# Patient Record
Sex: FEMALE | Race: WHITE | Employment: UNEMPLOYED | URBAN - METROPOLITAN AREA
[De-identification: names, ages, dates, MRNs, and addresses within clinical notes are randomized per-mention and may not be internally consistent; named-entity substitution may affect disease eponyms.]

---

## 2017-01-25 ENCOUNTER — OFFICE VISIT (OUTPATIENT)
Dept: CARDIOLOGY CLINIC | Age: 82
End: 2017-01-25

## 2017-01-25 ENCOUNTER — HOSPITAL ENCOUNTER (OUTPATIENT)
Dept: OTHER | Age: 82
Discharge: HOME | End: 2017-01-25
Attending: INTERNAL MEDICINE | Admitting: INTERNAL MEDICINE

## 2017-01-25 VITALS
SYSTOLIC BLOOD PRESSURE: 110 MMHG | HEIGHT: 65 IN | DIASTOLIC BLOOD PRESSURE: 70 MMHG | BODY MASS INDEX: 19.99 KG/M2 | HEART RATE: 82 BPM | WEIGHT: 120 LBS

## 2017-01-25 DIAGNOSIS — I10 ESSENTIAL HYPERTENSION: ICD-10-CM

## 2017-01-25 DIAGNOSIS — I25.10 CORONARY ARTERY DISEASE INVOLVING NATIVE HEART WITHOUT ANGINA PECTORIS, UNSPECIFIED VESSEL OR LESION TYPE: ICD-10-CM

## 2017-01-25 DIAGNOSIS — I48.19 PERSISTENT ATRIAL FIBRILLATION (HCC): ICD-10-CM

## 2017-01-25 DIAGNOSIS — R42 DIZZINESS: ICD-10-CM

## 2017-01-25 DIAGNOSIS — E78.5 HYPERLIPIDEMIA, UNSPECIFIED HYPERLIPIDEMIA TYPE: ICD-10-CM

## 2017-01-25 DIAGNOSIS — I25.110 ATHEROSCLEROTIC HEART DISEASE OF NATIVE CORONARY ARTERY WITH UNSTABLE ANGINA PECTORIS (HCC): ICD-10-CM

## 2017-01-25 DIAGNOSIS — R42 DIZZINESS: Primary | ICD-10-CM

## 2017-01-25 DIAGNOSIS — I65.29 STENOSIS OF CAROTID ARTERY, UNSPECIFIED LATERALITY: ICD-10-CM

## 2017-01-25 LAB
ANION GAP SERPL CALCULATED.3IONS-SCNC: 17 MMOL/L (ref 3–16)
BASOPHILS ABSOLUTE: 0.1 K/UL (ref 0–0.2)
BASOPHILS RELATIVE PERCENT: 0.6 %
BUN BLDV-MCNC: 20 MG/DL (ref 7–20)
CALCIUM SERPL-MCNC: 9.7 MG/DL (ref 8.3–10.6)
CHLORIDE BLD-SCNC: 103 MMOL/L (ref 99–110)
CO2: 25 MMOL/L (ref 21–32)
CREAT SERPL-MCNC: 1.3 MG/DL (ref 0.6–1.2)
EOSINOPHILS ABSOLUTE: 0.1 K/UL (ref 0–0.6)
EOSINOPHILS RELATIVE PERCENT: 1.4 %
FOLATE: >20 NG/ML (ref 4.78–24.2)
GFR AFRICAN AMERICAN: 46
GFR NON-AFRICAN AMERICAN: 38
GLUCOSE BLD-MCNC: 84 MG/DL (ref 70–99)
HCT VFR BLD CALC: 48.6 % (ref 36–48)
HEMOGLOBIN: 16 G/DL (ref 12–16)
LYMPHOCYTES ABSOLUTE: 3 K/UL (ref 1–5.1)
LYMPHOCYTES RELATIVE PERCENT: 33.3 %
MCH RBC QN AUTO: 32.3 PG (ref 26–34)
MCHC RBC AUTO-ENTMCNC: 33 G/DL (ref 31–36)
MCV RBC AUTO: 97.9 FL (ref 80–100)
MONOCYTES ABSOLUTE: 0.9 K/UL (ref 0–1.3)
MONOCYTES RELATIVE PERCENT: 9.9 %
NEUTROPHILS ABSOLUTE: 5 K/UL (ref 1.7–7.7)
NEUTROPHILS RELATIVE PERCENT: 54.8 %
PDW BLD-RTO: 14 % (ref 12.4–15.4)
PLATELET # BLD: 150 K/UL (ref 135–450)
PMV BLD AUTO: 8.4 FL (ref 5–10.5)
POTASSIUM SERPL-SCNC: 4.9 MMOL/L (ref 3.5–5.1)
PRO-BNP: 2651 PG/ML (ref 0–449)
RBC # BLD: 4.96 M/UL (ref 4–5.2)
SEDIMENTATION RATE, ERYTHROCYTE: 13 MM/HR (ref 0–30)
SODIUM BLD-SCNC: 145 MMOL/L (ref 136–145)
TSH REFLEX: 3.02 UIU/ML (ref 0.27–4.2)
VITAMIN B-12: 639 PG/ML (ref 211–911)
WBC # BLD: 9.1 K/UL (ref 4–11)

## 2017-01-25 PROCEDURE — 99214 OFFICE O/P EST MOD 30 MIN: CPT | Performed by: INTERNAL MEDICINE

## 2017-01-25 RX ORDER — ASPIRIN 81 MG/1
TABLET ORAL
Qty: 30 TABLET | Refills: 0 | COMMUNITY
Start: 2017-01-25 | End: 2018-01-05

## 2017-01-30 RX ORDER — METOPROLOL SUCCINATE 50 MG/1
TABLET, EXTENDED RELEASE ORAL
Qty: 30 TABLET | Refills: 6 | Status: SHIPPED | OUTPATIENT
Start: 2017-01-30 | End: 2017-10-04 | Stop reason: SDUPTHER

## 2017-02-01 ENCOUNTER — NURSE ONLY (OUTPATIENT)
Dept: CARDIOLOGY CLINIC | Age: 82
End: 2017-02-01

## 2017-02-01 DIAGNOSIS — R42 DIZZINESS: Primary | ICD-10-CM

## 2017-02-06 ENCOUNTER — OFFICE VISIT (OUTPATIENT)
Dept: FAMILY MEDICINE CLINIC | Age: 82
End: 2017-02-06

## 2017-02-06 VITALS
HEART RATE: 66 BPM | WEIGHT: 119 LBS | BODY MASS INDEX: 19.8 KG/M2 | DIASTOLIC BLOOD PRESSURE: 62 MMHG | OXYGEN SATURATION: 99 % | SYSTOLIC BLOOD PRESSURE: 116 MMHG

## 2017-02-06 DIAGNOSIS — Z23 NEEDS FLU SHOT: Primary | ICD-10-CM

## 2017-02-06 DIAGNOSIS — Z87.898 H/O DIZZINESS: ICD-10-CM

## 2017-02-06 PROCEDURE — 99213 OFFICE O/P EST LOW 20 MIN: CPT | Performed by: FAMILY MEDICINE

## 2017-02-06 PROCEDURE — G0008 ADMIN INFLUENZA VIRUS VAC: HCPCS | Performed by: FAMILY MEDICINE

## 2017-02-06 PROCEDURE — 90688 IIV4 VACCINE SPLT 0.5 ML IM: CPT | Performed by: FAMILY MEDICINE

## 2017-02-07 PROCEDURE — 93224 XTRNL ECG REC UP TO 48 HRS: CPT | Performed by: INTERNAL MEDICINE

## 2017-02-08 ENCOUNTER — ANTI-COAG VISIT (OUTPATIENT)
Dept: PHARMACY | Age: 82
End: 2017-02-08

## 2017-02-08 DIAGNOSIS — I48.19 PERSISTENT ATRIAL FIBRILLATION (HCC): ICD-10-CM

## 2017-02-08 LAB
INR BLD: 2.4
PROTIME: 29.2 SECONDS

## 2017-03-08 ENCOUNTER — ANTI-COAG VISIT (OUTPATIENT)
Dept: PHARMACY | Age: 82
End: 2017-03-08

## 2017-03-08 DIAGNOSIS — I48.19 PERSISTENT ATRIAL FIBRILLATION (HCC): ICD-10-CM

## 2017-03-08 LAB
INR BLD: 2.5
PROTIME: 30.1 SECONDS

## 2017-04-19 ENCOUNTER — ANTI-COAG VISIT (OUTPATIENT)
Dept: PHARMACY | Age: 82
End: 2017-04-19

## 2017-04-19 DIAGNOSIS — I48.19 PERSISTENT ATRIAL FIBRILLATION (HCC): ICD-10-CM

## 2017-04-19 LAB
INR BLD: 2.3
PROTIME: 27.1 SECONDS

## 2017-05-15 ENCOUNTER — TELEPHONE (OUTPATIENT)
Dept: CARDIOLOGY CLINIC | Age: 82
End: 2017-05-15

## 2017-05-15 RX ORDER — SIMVASTATIN 20 MG
20 TABLET ORAL NIGHTLY
Qty: 90 TABLET | Refills: 3 | Status: SHIPPED | OUTPATIENT
Start: 2017-05-15 | End: 2018-01-05 | Stop reason: SDUPTHER

## 2017-05-31 ENCOUNTER — ANTI-COAG VISIT (OUTPATIENT)
Dept: PHARMACY | Age: 82
End: 2017-05-31

## 2017-05-31 DIAGNOSIS — I48.19 PERSISTENT ATRIAL FIBRILLATION (HCC): ICD-10-CM

## 2017-05-31 LAB
INR BLD: 2.1
PROTIME: 24.7 SECONDS

## 2017-07-18 ENCOUNTER — ANTI-COAG VISIT (OUTPATIENT)
Dept: PHARMACY | Age: 82
End: 2017-07-18

## 2017-07-18 DIAGNOSIS — I48.91 ATRIAL FIBRILLATION, UNSPECIFIED TYPE (HCC): ICD-10-CM

## 2017-07-18 LAB
INR BLD: 2.3
PROTIME: 27.5 SECONDS

## 2017-07-20 ENCOUNTER — OFFICE VISIT (OUTPATIENT)
Dept: ENT CLINIC | Age: 82
End: 2017-07-20

## 2017-07-20 VITALS
HEART RATE: 74 BPM | WEIGHT: 119 LBS | DIASTOLIC BLOOD PRESSURE: 62 MMHG | HEIGHT: 63 IN | BODY MASS INDEX: 21.09 KG/M2 | SYSTOLIC BLOOD PRESSURE: 128 MMHG | RESPIRATION RATE: 16 BRPM

## 2017-07-20 DIAGNOSIS — H90.5 HEARING DISORDER, SENSORINEURAL: Primary | ICD-10-CM

## 2017-07-20 DIAGNOSIS — H61.23 BILATERAL IMPACTED CERUMEN: ICD-10-CM

## 2017-07-20 PROCEDURE — 69210 REMOVE IMPACTED EAR WAX UNI: CPT | Performed by: OTOLARYNGOLOGY

## 2017-07-24 PROBLEM — S32.9XXA CLOSED FRACTURE OF PELVIS (HCC): Status: ACTIVE | Noted: 2017-07-24

## 2017-07-28 ENCOUNTER — TELEPHONE (OUTPATIENT)
Dept: PHARMACY | Age: 82
End: 2017-07-28

## 2017-08-15 ENCOUNTER — TELEPHONE (OUTPATIENT)
Dept: CARDIOLOGY CLINIC | Age: 82
End: 2017-08-15

## 2017-08-15 ENCOUNTER — TELEPHONE (OUTPATIENT)
Dept: PHARMACY | Age: 82
End: 2017-08-15

## 2017-08-16 ENCOUNTER — OFFICE VISIT (OUTPATIENT)
Dept: ORTHOPEDIC SURGERY | Age: 82
End: 2017-08-16

## 2017-08-16 VITALS — WEIGHT: 121.47 LBS | HEIGHT: 63 IN | BODY MASS INDEX: 21.52 KG/M2

## 2017-08-16 DIAGNOSIS — S32.9XXS: Primary | ICD-10-CM

## 2017-08-16 PROCEDURE — 99213 OFFICE O/P EST LOW 20 MIN: CPT | Performed by: ORTHOPAEDIC SURGERY

## 2017-08-16 PROCEDURE — 72170 X-RAY EXAM OF PELVIS: CPT | Performed by: ORTHOPAEDIC SURGERY

## 2017-08-16 RX ORDER — ACETAMINOPHEN 500 MG
1000 TABLET ORAL EVERY 6 HOURS PRN
Qty: 60 TABLET | Refills: 3 | Status: SHIPPED | OUTPATIENT
Start: 2017-08-16

## 2017-08-17 ENCOUNTER — TELEPHONE (OUTPATIENT)
Dept: FAMILY MEDICINE CLINIC | Age: 82
End: 2017-08-17

## 2017-08-18 ENCOUNTER — ANTI-COAG VISIT (OUTPATIENT)
Dept: CARDIOLOGY CLINIC | Age: 82
End: 2017-08-18

## 2017-08-18 ENCOUNTER — TELEPHONE (OUTPATIENT)
Dept: CARDIOLOGY CLINIC | Age: 82
End: 2017-08-18

## 2017-08-18 ENCOUNTER — TELEPHONE (OUTPATIENT)
Dept: PHARMACY | Age: 82
End: 2017-08-18

## 2017-08-18 ENCOUNTER — TELEPHONE (OUTPATIENT)
Dept: FAMILY MEDICINE CLINIC | Age: 82
End: 2017-08-18

## 2017-08-18 LAB — INR BLD: 2.8

## 2017-08-24 ENCOUNTER — TELEPHONE (OUTPATIENT)
Dept: CARDIOLOGY CLINIC | Age: 82
End: 2017-08-24

## 2017-08-24 ENCOUNTER — ANTI-COAG VISIT (OUTPATIENT)
Dept: CARDIOLOGY CLINIC | Age: 82
End: 2017-08-24

## 2017-08-24 LAB — INR BLD: 3.9

## 2017-08-29 ENCOUNTER — TELEPHONE (OUTPATIENT)
Dept: PHARMACY | Age: 82
End: 2017-08-29

## 2017-08-29 ENCOUNTER — TELEPHONE (OUTPATIENT)
Dept: CARDIOLOGY CLINIC | Age: 82
End: 2017-08-29

## 2017-08-29 DIAGNOSIS — R42 DIZZINESS: Primary | ICD-10-CM

## 2017-08-31 ENCOUNTER — ANTI-COAG VISIT (OUTPATIENT)
Dept: CARDIOLOGY CLINIC | Age: 82
End: 2017-08-31

## 2017-08-31 ENCOUNTER — TELEPHONE (OUTPATIENT)
Dept: CARDIOLOGY CLINIC | Age: 82
End: 2017-08-31

## 2017-08-31 LAB — INR BLD: 3.4

## 2017-09-07 ENCOUNTER — TELEPHONE (OUTPATIENT)
Dept: CARDIOLOGY CLINIC | Age: 82
End: 2017-09-07

## 2017-09-07 ENCOUNTER — ANTI-COAG VISIT (OUTPATIENT)
Dept: CARDIOLOGY CLINIC | Age: 82
End: 2017-09-07

## 2017-09-07 LAB — INR BLD: 2.4

## 2017-09-14 ENCOUNTER — TELEPHONE (OUTPATIENT)
Dept: CARDIOLOGY CLINIC | Age: 82
End: 2017-09-14

## 2017-09-14 ENCOUNTER — ANTI-COAG VISIT (OUTPATIENT)
Dept: CARDIOLOGY CLINIC | Age: 82
End: 2017-09-14

## 2017-09-14 LAB — INR BLD: 3.9

## 2017-10-04 ENCOUNTER — TELEPHONE (OUTPATIENT)
Dept: CARDIOLOGY CLINIC | Age: 82
End: 2017-10-04

## 2017-10-04 ENCOUNTER — ANTI-COAG VISIT (OUTPATIENT)
Dept: PHARMACY | Age: 82
End: 2017-10-04

## 2017-10-04 DIAGNOSIS — I48.91 ATRIAL FIBRILLATION, UNSPECIFIED TYPE (HCC): ICD-10-CM

## 2017-10-04 LAB
INR BLD: 2.6
PROTIME: 31.8 SECONDS

## 2017-10-04 RX ORDER — METOPROLOL SUCCINATE 50 MG/1
TABLET, EXTENDED RELEASE ORAL
Qty: 30 TABLET | Refills: 11 | Status: SHIPPED | OUTPATIENT
Start: 2017-10-04 | End: 2018-01-05 | Stop reason: SDUPTHER

## 2017-10-04 NOTE — MR AVS SNAPSHOT
After Visit Summary             Elana Rg   10/4/2017 11:15 AM   Anti-coag visit    Description:  Female : 1921   Provider:  RILEY Perry KRISTIN [facility]   Department:  [address]/[facility]              Your Follow-Up and Future Appointments         Below is a list of your follow-up and future appointments. This may not be a complete list as you may have made appointments directly with providers that we are not aware of or your providers may have made some for you. Please call your providers to confirm appointments. It is important to keep your appointments. Please bring your current insurance card, photo ID, co-pay, and all medication bottles to your appointment. If self-pay, payment is expected at the time of service. Your To-Do List     Future Appointments Provider Department Dept Phone    2017 11:00 AM [facility] [phone]         Information from Your Visit        Department     Name Address Phone Fax    [address]/[facility] [address] [place] [phone]      You Were Seen for:         Comments    Atrial fibrillation, unspecified type [facility])   [[id number]]         Anticoagulation Summary as of 10/4/2017              Today's INR 2.6    Next INR check 2017      Description           Continue same weekly dose of 2.5mg daily      Vital Signs     Last Menstrual Period Smoking Status                1975 Former Smoker             Medications and Orders      Your Current Medications Are              acetaminophen (APAP EXTRA STRENGTH) 500 MG tablet Take 2 tablets by mouth every 6 hours as needed for Pain    docusate sodium (COLACE, DULCOLAX) 100 MG CAPS Take 100 mg by mouth 2 times daily    lidocaine (LIDODERM) 5 % Place 1 patch onto the skin daily 12 hours on, 12 hours off.

## 2017-10-04 NOTE — TELEPHONE ENCOUNTER
E-prescribed the Metoprolol ER 50 mg, one tab po daily, into [facility], [phone].

## 2017-10-04 NOTE — TELEPHONE ENCOUNTER
Medication Refill    When was your last appointment with cardiology?  (if 1year or longer, please schedule an appointment)    Medication needing refilled:metoprolol ER     Doseage of the medication:50mg     How are you taking this medication (QD, BID, TID, QID, PRN):    Patient want a 30 or 90 day supply called in:30    Which Pharmacy are we sending the medication to:[facility]     Pharmacy Phone number:totally out of med , needs today    Pharmacy Fax number:

## 2017-11-01 ENCOUNTER — ANTI-COAG VISIT (OUTPATIENT)
Dept: PHARMACY | Age: 82
End: 2017-11-01

## 2017-11-01 ENCOUNTER — HOSPITAL ENCOUNTER (OUTPATIENT)
Dept: OTHER | Age: 82
Discharge: HOME | End: 2017-11-30
Attending: INTERNAL MEDICINE | Admitting: INTERNAL MEDICINE

## 2017-11-01 DIAGNOSIS — I48.91 ATRIAL FIBRILLATION, UNSPECIFIED TYPE (HCC): ICD-10-CM

## 2017-11-01 LAB
INR BLD: 2.7
PROTIME: 32.4 SECONDS

## 2017-11-01 NOTE — PROGRESS NOTES
Mrs. Dodie Saavedra is a 80 y.o. y/o female with history of Afib diagnosed and started warfarin about June 2015. Pertinent PMH: HTN, CAD, Mild MI with stent placement, TIA, possible IBS undiagnosed  She presents today for anticoagulation monitoring and adjustment    Patient Reported Findings:  Yes     No  [x]   []       Patient verifies current dosing regimen as listed  []   [x]       S/S bleeding/bruising/swelling/SOB  []   [x]       Blood in urine or stool  []   [x]       Procedures scheduled in the future at this time  []   [x]       Missed Dose  []   [x]       Extra Dose  []   [x]       Change in medications  []   [x]       Change in health/diet/appetite  []   [x]       Change in alcohol use  []   [x]       Change in activity  []   [x]       Hospital admission  []   [x]       Emergency department visit  []   [x]       Other complaints    Clinical Outcomes:  Yes     No  []   [x]       Major bleeding event  []   [x]       Thromboembolic event    Duration of warfarin Therapy: indefinitely  INR Range:  2.0-3.0  She presents today accompanied, as usual, by her son Elia Denny. INR 2.7 today  Continue same weekly dose of 2.5mg daily  Encouraged to maintain a consistency of vegetables/salads.   Recheck INR in 4 weeks    Referring cardiologist is Dr. Noreen Calderon  INR (no units)   Date Value   11/01/2017 2.7   10/04/2017 2.6   09/14/2017 3.90   09/07/2017 2.40

## 2017-11-29 ENCOUNTER — ANTI-COAG VISIT (OUTPATIENT)
Dept: PHARMACY | Age: 82
End: 2017-11-29

## 2017-11-29 DIAGNOSIS — I48.91 ATRIAL FIBRILLATION, UNSPECIFIED TYPE (HCC): ICD-10-CM

## 2017-11-29 LAB
INR BLD: 2.4
PROTIME: 29.4 SECONDS

## 2017-11-29 NOTE — PROGRESS NOTES
Mrs. Dionisio Walker is a 80 y.o. y/o female with history of Afib diagnosed and started warfarin about June 2015. Pertinent PMH: HTN, CAD, Mild MI with stent placement, TIA, possible IBS undiagnosed  She presents today for anticoagulation monitoring and adjustment    Patient Reported Findings:  Yes     No  [x]   []       Patient verifies current dosing regimen as listed  []   [x]       S/S bleeding/bruising/swelling/SOB  []   [x]       Blood in urine or stool  []   [x]       Procedures scheduled in the future at this time  []   [x]       Missed Dose  []   [x]       Extra Dose  []   [x]       Change in medications  []   [x]       Change in health/diet/appetite  []   [x]       Change in alcohol use  []   [x]       Change in activity  []   [x]       Hospital admission  []   [x]       Emergency department visit  []   [x]       Other complaints    Clinical Outcomes:  Yes     No  []   [x]       Major bleeding event  []   [x]       Thromboembolic event    Duration of warfarin Therapy: indefinitely  INR Range:  2.0-3.0  She presents today accompanied, as usual, by her son Yelitza Russell. INR 2.7 today  Continue same weekly dose of 2.5mg daily  Encouraged to maintain a consistency of vegetables/salads.   Recheck INR in 6 weeks    Referring cardiologist is Dr. Gunner Brown  INR (no units)   Date Value   11/29/2017 2.4   11/01/2017 2.7   10/04/2017 2.6   09/14/2017 3.90

## 2017-11-30 NOTE — TELEPHONE ENCOUNTER
Warfarin prescription phoned into [facility] [address] to [address] under Dr. Michael Mccrary  Warfarin 2.5 mg tabs  Take 2.5mg daily   90 days   2 refills

## 2017-12-01 ENCOUNTER — HOSPITAL ENCOUNTER (OUTPATIENT)
Dept: OTHER | Age: 82
Discharge: HOME | End: 2017-12-31
Attending: INTERNAL MEDICINE | Admitting: INTERNAL MEDICINE

## 2018-01-01 ENCOUNTER — ANTI-COAG VISIT (OUTPATIENT)
Dept: PHARMACY | Age: 83
End: 2018-01-01
Payer: MEDICARE

## 2018-01-01 ENCOUNTER — APPOINTMENT (OUTPATIENT)
Dept: PHARMACY | Age: 83
End: 2018-01-01
Payer: MEDICARE

## 2018-01-01 ENCOUNTER — HOSPITAL ENCOUNTER (OUTPATIENT)
Dept: OTHER | Age: 83
Discharge: HOME | End: 2018-01-31
Attending: INTERNAL MEDICINE | Admitting: INTERNAL MEDICINE

## 2018-01-01 DIAGNOSIS — I48.21 PERMANENT ATRIAL FIBRILLATION (HCC): ICD-10-CM

## 2018-01-01 LAB
INTERNATIONAL NORMALIZATION RATIO, POC: 2.6
INTERNATIONAL NORMALIZATION RATIO, POC: 2.8

## 2018-01-01 PROCEDURE — 99211 OFF/OP EST MAY X REQ PHY/QHP: CPT

## 2018-01-01 PROCEDURE — 85610 PROTHROMBIN TIME: CPT

## 2018-01-05 ENCOUNTER — OFFICE VISIT (OUTPATIENT)
Dept: CARDIOLOGY CLINIC | Age: 83
End: 2018-01-05

## 2018-01-05 VITALS
HEIGHT: 62 IN | SYSTOLIC BLOOD PRESSURE: 130 MMHG | BODY MASS INDEX: 22.08 KG/M2 | HEART RATE: 92 BPM | WEIGHT: 120 LBS | DIASTOLIC BLOOD PRESSURE: 76 MMHG

## 2018-01-05 DIAGNOSIS — I10 ESSENTIAL HYPERTENSION: ICD-10-CM

## 2018-01-05 DIAGNOSIS — I48.21 PERMANENT ATRIAL FIBRILLATION (HCC): ICD-10-CM

## 2018-01-05 DIAGNOSIS — I25.10 CORONARY ARTERY DISEASE INVOLVING NATIVE CORONARY ARTERY OF NATIVE HEART WITHOUT ANGINA PECTORIS: Primary | ICD-10-CM

## 2018-01-05 DIAGNOSIS — R42 DIZZINESS: ICD-10-CM

## 2018-01-05 DIAGNOSIS — E78.00 HYPERCHOLESTEREMIA: ICD-10-CM

## 2018-01-05 PROCEDURE — 99214 OFFICE O/P EST MOD 30 MIN: CPT | Performed by: INTERNAL MEDICINE

## 2018-01-05 RX ORDER — METOPROLOL SUCCINATE 50 MG/1
TABLET, EXTENDED RELEASE ORAL
Qty: 90 TABLET | Refills: 3 | Status: SHIPPED | OUTPATIENT
Start: 2018-01-05 | End: 2019-01-01 | Stop reason: SDUPTHER

## 2018-01-05 RX ORDER — SIMVASTATIN 20 MG
20 TABLET ORAL NIGHTLY
Qty: 90 TABLET | Refills: 3 | Status: SHIPPED | OUTPATIENT
Start: 2018-01-05 | End: 2019-01-01 | Stop reason: SDUPTHER

## 2018-01-10 ENCOUNTER — ANTI-COAG VISIT (OUTPATIENT)
Dept: PHARMACY | Age: 83
End: 2018-01-10

## 2018-01-10 DIAGNOSIS — I48.21 PERMANENT ATRIAL FIBRILLATION (HCC): ICD-10-CM

## 2018-01-10 LAB
INR BLD: 2.5
PROTIME: 29.5 SECONDS

## 2018-01-10 NOTE — PROGRESS NOTES
Mrs. Sue Roberts is a 80 y.o. y/o female with history of Afib diagnosed and started warfarin about June 2015. Pertinent PMH: HTN, CAD, Mild MI with stent placement, TIA, possible IBS undiagnosed  She presents today for anticoagulation monitoring and adjustment    Patient Reported Findings:  Yes     No  [x]   []       Patient verifies current dosing regimen as listed  []   [x]       S/S bleeding/bruising/swelling/SOB  []   [x]       Blood in urine or stool  []   [x]       Procedures scheduled in the future at this time  []   [x]       Missed Dose  []   [x]       Extra Dose  []   [x]       Change in medications  []   [x]       Change in health/diet/appetite  []   [x]       Change in alcohol use  []   [x]       Change in activity  []   [x]       Hospital admission  []   [x]       Emergency department visit  []   [x]       Other complaints    Clinical Outcomes:  Yes     No  []   [x]       Major bleeding event  []   [x]       Thromboembolic event    Duration of warfarin Therapy: indefinitely  INR Range:  2.0-3.0  She presents today accompanied, as usual, by her son Jon Loving. INR 2.5 today  Continue same weekly dose of 2.5mg daily  Encouraged to maintain a consistency of vegetables/salads.   Recheck INR in 6 weeks    Referring cardiologist is Dr. Nimesh Ruiz  INR (no units)   Date Value   01/10/2018 2.5   11/29/2017 2.4   11/01/2017 2.7   10/04/2017 2.6

## 2018-02-01 ENCOUNTER — HOSPITAL ENCOUNTER (OUTPATIENT)
Dept: OTHER | Age: 83
Discharge: HOME | End: 2018-02-28
Attending: INTERNAL MEDICINE | Admitting: INTERNAL MEDICINE

## 2018-02-14 ENCOUNTER — OFFICE VISIT (OUTPATIENT)
Dept: ORTHOPEDIC SURGERY | Age: 83
End: 2018-02-14

## 2018-02-14 DIAGNOSIS — M76.31 ILIOTIBIAL BAND SYNDROME OF RIGHT SIDE: ICD-10-CM

## 2018-02-14 DIAGNOSIS — S32.9XXS CLOSED NONDISPLACED FRACTURE OF PELVIS, UNSPECIFIED PART OF PELVIS, SEQUELA: Primary | ICD-10-CM

## 2018-02-14 PROCEDURE — 99213 OFFICE O/P EST LOW 20 MIN: CPT | Performed by: ORTHOPAEDIC SURGERY

## 2018-02-14 NOTE — PROGRESS NOTES
are no rashes, ulcerations or lesions. Gait: She does use a walker to assist with ambulation    Additional Comments:       Additional Examinations:         Left Lower Extremity: Examination of the left lower extremity does not show any tenderness, deformity or injury. Range of motion is unremarkable. There is no gross instability. There are no rashes, ulcerations or lesions. Strength and tone are normal.    Radiology:     X-rays obtained and reviewed in office:  Views 6 views of the pelvis right hip and thigh does demonstrate surgical hardware intact and in good position. There is no evidence of stress reaction about the right femoral stem or distal to the femoral stem. 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  Closed nondisplaced fracture of pelvis, unspecified part of pelvis, sequela Yes    Iliotibial band syndrome of right side        Office Procedures:  Orders Placed This Encounter   Procedures    XR HIP RIGHT (2-3 VIEWS)     [address]     Order Specific Question:   Reason for exam:     Answer:   Pain       Treatment Plan:  I discussed diagnosis and prognosis with her today. I do not see any stress reaction within her femur and considering this is been going on for roughly 3-4 years I'm comfortable that we are not dealing with a stress fracture within the femur. She is tender to palpation over the ITB and I'm going to give her some IT band stretching exercises. She also does use Tylenol once a day she is going to increase usage to roughly 2 or 3 times a day. I'll see her back if this is not improving in the future.

## 2018-02-21 ENCOUNTER — ANTI-COAG VISIT (OUTPATIENT)
Dept: PHARMACY | Age: 83
End: 2018-02-21

## 2018-02-21 DIAGNOSIS — I48.21 PERMANENT ATRIAL FIBRILLATION (HCC): ICD-10-CM

## 2018-02-21 LAB
INR BLD: 2.6
PROTIME: 31.1 SECONDS

## 2018-03-01 ENCOUNTER — HOSPITAL ENCOUNTER (OUTPATIENT)
Dept: OTHER | Age: 83
Discharge: HOME | End: 2018-03-31
Attending: INTERNAL MEDICINE | Admitting: INTERNAL MEDICINE

## 2018-04-01 ENCOUNTER — HOSPITAL ENCOUNTER (OUTPATIENT)
Dept: OTHER | Age: 83
Discharge: HOME | End: 2018-04-30
Attending: INTERNAL MEDICINE | Admitting: INTERNAL MEDICINE

## 2018-04-04 ENCOUNTER — ANTI-COAG VISIT (OUTPATIENT)
Dept: PHARMACY | Age: 83
End: 2018-04-04

## 2018-04-04 DIAGNOSIS — I48.21 PERMANENT ATRIAL FIBRILLATION (HCC): ICD-10-CM

## 2018-04-04 LAB
INR BLD: 1.4
PROTIME: 17.3 SECONDS

## 2018-04-18 ENCOUNTER — ANTI-COAG VISIT (OUTPATIENT)
Dept: PHARMACY | Age: 83
End: 2018-04-18

## 2018-04-18 DIAGNOSIS — I48.21 PERMANENT ATRIAL FIBRILLATION (HCC): ICD-10-CM

## 2018-04-18 LAB
INR BLD: 2
PROTIME: 23.6 SECONDS

## 2018-05-01 ENCOUNTER — HOSPITAL ENCOUNTER (OUTPATIENT)
Dept: OTHER | Age: 83
Discharge: HOME | End: 2018-05-31
Attending: INTERNAL MEDICINE | Admitting: INTERNAL MEDICINE

## 2018-05-30 ENCOUNTER — ANTI-COAG VISIT (OUTPATIENT)
Dept: PHARMACY | Age: 83
End: 2018-05-30

## 2018-05-30 DIAGNOSIS — I48.21 PERMANENT ATRIAL FIBRILLATION (HCC): ICD-10-CM

## 2018-05-30 LAB
INR BLD: 2.4
PROTIME: 28.8 SECONDS

## 2018-06-01 ENCOUNTER — HOSPITAL ENCOUNTER (OUTPATIENT)
Dept: OTHER | Age: 83
Discharge: HOME | End: 2018-06-30
Attending: INTERNAL MEDICINE | Admitting: INTERNAL MEDICINE

## 2018-07-01 ENCOUNTER — HOSPITAL ENCOUNTER (OUTPATIENT)
Dept: OTHER | Age: 83
Discharge: HOME | End: 2018-07-31
Attending: INTERNAL MEDICINE | Admitting: INTERNAL MEDICINE

## 2018-07-18 ENCOUNTER — ANTI-COAG VISIT (OUTPATIENT)
Dept: PHARMACY | Age: 83
End: 2018-07-18

## 2018-07-18 DIAGNOSIS — I48.21 PERMANENT ATRIAL FIBRILLATION (HCC): ICD-10-CM

## 2018-07-18 LAB
INR BLD: 2.2
PROTIME: 27.8 SECONDS

## 2018-07-18 RX ORDER — WARFARIN SODIUM 2.5 MG/1
2.5 TABLET ORAL SEE ADMIN INSTRUCTIONS
Status: ON HOLD | COMMUNITY
End: 2019-01-01 | Stop reason: HOSPADM

## 2018-08-01 ENCOUNTER — HOSPITAL ENCOUNTER (OUTPATIENT)
Dept: OTHER | Age: 83
Discharge: HOME | End: 2018-08-31
Attending: INTERNAL MEDICINE | Admitting: INTERNAL MEDICINE

## 2018-08-29 ENCOUNTER — ANTI-COAG VISIT (OUTPATIENT)
Dept: PHARMACY | Age: 83
End: 2018-08-29

## 2018-08-29 DIAGNOSIS — I48.21 PERMANENT ATRIAL FIBRILLATION (HCC): ICD-10-CM

## 2018-08-29 LAB
INR BLD: 2.5
PROTIME: 29.5 SECONDS

## 2018-09-01 ENCOUNTER — HOSPITAL ENCOUNTER (OUTPATIENT)
Dept: OTHER | Age: 83
Discharge: HOME | End: 2018-09-01
Attending: INTERNAL MEDICINE | Admitting: INTERNAL MEDICINE

## 2018-11-28 NOTE — PROGRESS NOTES
Mrs. Chi Dee is a 80 y.o. y/o female with history of Afib diagnosed and started warfarin about June 2015. Pertinent PMH: HTN, CAD, Mild MI with stent placement, TIA, possible IBS undiagnosed  She presents today for anticoagulation monitoring and adjustment    Patient Reported Findings:  Yes     No  [x]   []       Patient verifies current dosing regimen as listed  []   [x]       S/S bleeding/bruising/swelling/SOB  []   [x]       Blood in urine or stool  []   [x]       Procedures scheduled in the future at this time  []   [x]       Missed Dose   []   [x]       Extra Dose  []   [x]       Change in medications continues with Tylenol at bedtime and occasionally extra during the day. [x]   []       Change in health/diet/appetite Appetite has been gradually reducing and now she has been drinking Boost.  []   [x]       Change in alcohol use  []   [x]       Change in activity  []   [x]       Hospital admission  []   [x]       Emergency department visit  [x]   []       Other complaints She c/o of constant dizziness or blurred vision to the degree that she is not using her walker anymore and presents in a wheelchair. Clinical Outcomes:  Yes     No  []   [x]       Major bleeding event  []   [x]       Thromboembolic event    Duration of warfarin Therapy: indefinitely  INR Range:  2.0-3.0  She presents today accompanied, as usual, by her son Yusuf Renee. INR 2.8 today  Continue same weekly dose of 2.5mg daily. Since INR has been gradually rising over the year suggested trying to increase the vegetable diet as best that she can in an effort to keep the INR therapeutic. Encouraged to maintain a consistency of vegetables/salads.   Recheck INR in 5 weeks on 1/2/19  Normally 6 week appointments    Referring cardiologist is Dr. Humble Tobar  INR (no units)   Date Value   11/28/2018 2.8   10/17/2018 2.6   08/29/2018 2.5   07/18/2018 2.2   05/30/2018 2.4   04/18/2018 2

## 2019-01-01 ENCOUNTER — ANTI-COAG VISIT (OUTPATIENT)
Dept: PHARMACY | Age: 84
End: 2019-01-01
Payer: MEDICARE

## 2019-01-01 ENCOUNTER — APPOINTMENT (OUTPATIENT)
Dept: CT IMAGING | Age: 84
End: 2019-01-01
Payer: MEDICARE

## 2019-01-01 ENCOUNTER — APPOINTMENT (OUTPATIENT)
Dept: CT IMAGING | Age: 84
DRG: 062 | End: 2019-01-01
Payer: MEDICARE

## 2019-01-01 ENCOUNTER — TELEPHONE (OUTPATIENT)
Dept: FAMILY MEDICINE CLINIC | Age: 84
End: 2019-01-01

## 2019-01-01 ENCOUNTER — APPOINTMENT (OUTPATIENT)
Dept: GENERAL RADIOLOGY | Age: 84
DRG: 062 | End: 2019-01-01
Payer: MEDICARE

## 2019-01-01 ENCOUNTER — HOSPITAL ENCOUNTER (EMERGENCY)
Age: 84
Discharge: LEFT BEFORE DISCHARGE | End: 2019-08-11
Attending: EMERGENCY MEDICINE
Payer: MEDICARE

## 2019-01-01 ENCOUNTER — CARE COORDINATION (OUTPATIENT)
Dept: CARE COORDINATION | Age: 84
End: 2019-01-01

## 2019-01-01 ENCOUNTER — HOSPITAL ENCOUNTER (INPATIENT)
Age: 84
LOS: 5 days | Discharge: NURSING, REHAB OR LONG-TERM CARE | DRG: 062 | End: 2019-08-26
Attending: EMERGENCY MEDICINE | Admitting: EMERGENCY MEDICINE
Payer: MEDICARE

## 2019-01-01 ENCOUNTER — OFFICE VISIT (OUTPATIENT)
Dept: CARDIOLOGY CLINIC | Age: 84
End: 2019-01-01
Payer: MEDICARE

## 2019-01-01 ENCOUNTER — OFFICE VISIT (OUTPATIENT)
Dept: FAMILY MEDICINE CLINIC | Age: 84
End: 2019-01-01
Payer: MEDICARE

## 2019-01-01 ENCOUNTER — TELEPHONE (OUTPATIENT)
Dept: CARDIOLOGY CLINIC | Age: 84
End: 2019-01-01

## 2019-01-01 ENCOUNTER — ANTI-COAG VISIT (OUTPATIENT)
Dept: PHARMACY | Age: 84
End: 2019-01-01

## 2019-01-01 ENCOUNTER — TELEPHONE (OUTPATIENT)
Dept: PHARMACY | Age: 84
End: 2019-01-01

## 2019-01-01 ENCOUNTER — APPOINTMENT (OUTPATIENT)
Dept: GENERAL RADIOLOGY | Age: 84
End: 2019-01-01
Payer: MEDICARE

## 2019-01-01 VITALS
TEMPERATURE: 98.4 F | WEIGHT: 105.7 LBS | HEIGHT: 62 IN | OXYGEN SATURATION: 93 % | DIASTOLIC BLOOD PRESSURE: 66 MMHG | RESPIRATION RATE: 16 BRPM | BODY MASS INDEX: 19.45 KG/M2 | HEART RATE: 111 BPM | SYSTOLIC BLOOD PRESSURE: 124 MMHG

## 2019-01-01 VITALS
HEART RATE: 80 BPM | HEIGHT: 62 IN | OXYGEN SATURATION: 98 % | SYSTOLIC BLOOD PRESSURE: 130 MMHG | DIASTOLIC BLOOD PRESSURE: 66 MMHG | BODY MASS INDEX: 19.32 KG/M2 | WEIGHT: 105 LBS

## 2019-01-01 VITALS
WEIGHT: 105 LBS | DIASTOLIC BLOOD PRESSURE: 70 MMHG | BODY MASS INDEX: 19.2 KG/M2 | OXYGEN SATURATION: 98 % | SYSTOLIC BLOOD PRESSURE: 116 MMHG | HEART RATE: 67 BPM

## 2019-01-01 VITALS
DIASTOLIC BLOOD PRESSURE: 72 MMHG | TEMPERATURE: 98.1 F | HEART RATE: 80 BPM | RESPIRATION RATE: 21 BRPM | SYSTOLIC BLOOD PRESSURE: 162 MMHG | OXYGEN SATURATION: 97 %

## 2019-01-01 DIAGNOSIS — G45.9 TIA (TRANSIENT ISCHEMIC ATTACK): Primary | ICD-10-CM

## 2019-01-01 DIAGNOSIS — I63.9 ACUTE CVA (CEREBROVASCULAR ACCIDENT) (HCC): ICD-10-CM

## 2019-01-01 DIAGNOSIS — I48.21 PERMANENT ATRIAL FIBRILLATION (HCC): ICD-10-CM
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
## 2019-01-01 DIAGNOSIS — R42 DIZZINESS: ICD-10-CM

## 2019-01-01 DIAGNOSIS — R91.1 LUNG NODULE: ICD-10-CM

## 2019-01-01 DIAGNOSIS — I63.512 CEREBROVASCULAR ACCIDENT (CVA) DUE TO OCCLUSION OF LEFT MIDDLE CEREBRAL ARTERY (HCC): Primary | ICD-10-CM

## 2019-01-01 DIAGNOSIS — I48.19 PERSISTENT ATRIAL FIBRILLATION (HCC): ICD-10-CM

## 2019-01-01 DIAGNOSIS — E78.00 HYPERCHOLESTEREMIA: ICD-10-CM

## 2019-01-01 DIAGNOSIS — I65.21 STENOSIS OF RIGHT CAROTID ARTERY: ICD-10-CM

## 2019-01-01 DIAGNOSIS — I25.10 CORONARY ARTERY DISEASE INVOLVING NATIVE HEART WITHOUT ANGINA PECTORIS, UNSPECIFIED VESSEL OR LESION TYPE: ICD-10-CM

## 2019-01-01 DIAGNOSIS — I10 ESSENTIAL HYPERTENSION: ICD-10-CM

## 2019-01-01 DIAGNOSIS — I48.91 ATRIAL FIBRILLATION, UNSPECIFIED TYPE (HCC): ICD-10-CM

## 2019-01-01 DIAGNOSIS — I48.21 PERMANENT ATRIAL FIBRILLATION (HCC): Primary | ICD-10-CM

## 2019-01-01 DIAGNOSIS — E78.00 HYPERCHOLESTEREMIA: Primary | ICD-10-CM

## 2019-01-01 LAB
A/G RATIO: 1 (ref 1.1–2.2)
ABO/RH: NORMAL
ALBUMIN SERPL-MCNC: 3.5 G/DL (ref 3.4–5)
ALBUMIN SERPL-MCNC: 3.6 G/DL (ref 3.4–5)
ALBUMIN SERPL-MCNC: 4 G/DL (ref 3.4–5)
ALP BLD-CCNC: 58 U/L (ref 40–129)
ALP BLD-CCNC: 58 U/L (ref 40–129)
ALT SERPL-CCNC: 12 U/L (ref 10–40)
ALT SERPL-CCNC: 13 U/L (ref 10–40)
ANION GAP SERPL CALCULATED.3IONS-SCNC: 10 MMOL/L (ref 3–16)
ANION GAP SERPL CALCULATED.3IONS-SCNC: 12 MMOL/L (ref 3–16)
ANION GAP SERPL CALCULATED.3IONS-SCNC: 13 MMOL/L (ref 3–16)
ANTIBODY SCREEN: NORMAL
APTT: 27 SEC (ref 26–36)
AST SERPL-CCNC: 19 U/L (ref 15–37)
AST SERPL-CCNC: 22 U/L (ref 15–37)
BASOPHILS ABSOLUTE: 0 K/UL (ref 0–0.2)
BASOPHILS ABSOLUTE: 0.1 K/UL (ref 0–0.2)
BASOPHILS RELATIVE PERCENT: 0.7 %
BASOPHILS RELATIVE PERCENT: 0.9 %
BILIRUB SERPL-MCNC: 0.5 MG/DL (ref 0–1)
BILIRUB SERPL-MCNC: 0.8 MG/DL (ref 0–1)
BILIRUBIN DIRECT: <0.2 MG/DL (ref 0–0.3)
BILIRUBIN URINE: NEGATIVE
BILIRUBIN, INDIRECT: NORMAL MG/DL (ref 0–1)
BLOOD, URINE: ABNORMAL
BUN BLDV-MCNC: 15 MG/DL (ref 7–20)
BUN BLDV-MCNC: 17 MG/DL (ref 7–20)
BUN BLDV-MCNC: 20 MG/DL (ref 7–20)
CALCIUM SERPL-MCNC: 8.9 MG/DL (ref 8.3–10.6)
CALCIUM SERPL-MCNC: 9 MG/DL (ref 8.3–10.6)
CALCIUM SERPL-MCNC: 9.2 MG/DL (ref 8.3–10.6)
CHLORIDE BLD-SCNC: 101 MMOL/L (ref 99–110)
CHLORIDE BLD-SCNC: 105 MMOL/L (ref 99–110)
CHLORIDE BLD-SCNC: 107 MMOL/L (ref 99–110)
CHOLESTEROL, TOTAL: 155 MG/DL (ref 0–199)
CLARITY: CLEAR
CO2: 19 MMOL/L (ref 21–32)
CO2: 23 MMOL/L (ref 21–32)
CO2: 24 MMOL/L (ref 21–32)
COLOR: YELLOW
CREAT SERPL-MCNC: 0.9 MG/DL (ref 0.6–1.2)
CREAT SERPL-MCNC: 1 MG/DL (ref 0.6–1.2)
CREAT SERPL-MCNC: 1.1 MG/DL (ref 0.6–1.2)
EKG ATRIAL RATE: 111 BPM
EKG ATRIAL RATE: 49 BPM
EKG DIAGNOSIS: NORMAL
EKG DIAGNOSIS: NORMAL
EKG Q-T INTERVAL: 364 MS
EKG Q-T INTERVAL: 366 MS
EKG QRS DURATION: 78 MS
EKG QRS DURATION: 84 MS
EKG QTC CALCULATION (BAZETT): 427 MS
EKG QTC CALCULATION (BAZETT): 457 MS
EKG R AXIS: -58 DEGREES
EKG R AXIS: -61 DEGREES
EKG T AXIS: -44 DEGREES
EKG T AXIS: -49 DEGREES
EKG VENTRICULAR RATE: 83 BPM
EKG VENTRICULAR RATE: 94 BPM
EOSINOPHILS ABSOLUTE: 0 K/UL (ref 0–0.6)
EOSINOPHILS ABSOLUTE: 0.1 K/UL (ref 0–0.6)
EOSINOPHILS RELATIVE PERCENT: 0.2 %
EOSINOPHILS RELATIVE PERCENT: 1.4 %
EPITHELIAL CELLS, UA: 2 /HPF (ref 0–5)
ESTIMATED AVERAGE GLUCOSE: 119.8 MG/DL
GFR AFRICAN AMERICAN: 56
GFR AFRICAN AMERICAN: >60
GFR AFRICAN AMERICAN: >60
GFR NON-AFRICAN AMERICAN: 46
GFR NON-AFRICAN AMERICAN: 51
GFR NON-AFRICAN AMERICAN: 58
GLOBULIN: 3.6 G/DL
GLUCOSE BLD-MCNC: 103 MG/DL (ref 70–99)
GLUCOSE BLD-MCNC: 117 MG/DL (ref 70–99)
GLUCOSE BLD-MCNC: 89 MG/DL (ref 70–99)
GLUCOSE BLD-MCNC: 89 MG/DL (ref 70–99)
GLUCOSE URINE: NEGATIVE MG/DL
HBA1C MFR BLD: 5.8 %
HCT VFR BLD CALC: 46.8 % (ref 36–48)
HCT VFR BLD CALC: 47.1 % (ref 36–48)
HCT VFR BLD CALC: 49.2 % (ref 36–48)
HDLC SERPL-MCNC: 43 MG/DL (ref 40–60)
HEMOGLOBIN: 15.3 G/DL (ref 12–16)
HEMOGLOBIN: 15.5 G/DL (ref 12–16)
HEMOGLOBIN: 16.2 G/DL (ref 12–16)
HYALINE CASTS: 1 /LPF (ref 0–8)
INR BLD: 1.23 (ref 0.86–1.14)
INR BLD: 1.29 (ref 0.86–1.14)
INR BLD: 1.32 (ref 0.86–1.14)
INR BLD: 1.35 (ref 0.86–1.14)
INR BLD: 1.68 (ref 0.86–1.14)
INTERNATIONAL NORMALIZATION RATIO, POC: 1.8
INTERNATIONAL NORMALIZATION RATIO, POC: 2
INTERNATIONAL NORMALIZATION RATIO, POC: 2.1
INTERNATIONAL NORMALIZATION RATIO, POC: 2.1
INTERNATIONAL NORMALIZATION RATIO, POC: 2.2
INTERNATIONAL NORMALIZATION RATIO, POC: 2.3
INTERNATIONAL NORMALIZATION RATIO, POC: 2.7
KETONES, URINE: NEGATIVE MG/DL
LDL CHOLESTEROL CALCULATED: 84 MG/DL
LEUKOCYTE ESTERASE, URINE: ABNORMAL
LYMPHOCYTES ABSOLUTE: 1.3 K/UL (ref 1–5.1)
LYMPHOCYTES ABSOLUTE: 3 K/UL (ref 1–5.1)
LYMPHOCYTES RELATIVE PERCENT: 24 %
LYMPHOCYTES RELATIVE PERCENT: 31.6 %
MAGNESIUM: 2 MG/DL (ref 1.8–2.4)
MAGNESIUM: 2.1 MG/DL (ref 1.8–2.4)
MCH RBC QN AUTO: 32.9 PG (ref 26–34)
MCH RBC QN AUTO: 32.9 PG (ref 26–34)
MCH RBC QN AUTO: 33 PG (ref 26–34)
MCHC RBC AUTO-ENTMCNC: 32.7 G/DL (ref 31–36)
MCHC RBC AUTO-ENTMCNC: 32.9 G/DL (ref 31–36)
MCHC RBC AUTO-ENTMCNC: 33 G/DL (ref 31–36)
MCV RBC AUTO: 100.1 FL (ref 80–100)
MCV RBC AUTO: 100.7 FL (ref 80–100)
MCV RBC AUTO: 99.9 FL (ref 80–100)
MICROSCOPIC EXAMINATION: YES
MONOCYTES ABSOLUTE: 0.7 K/UL (ref 0–1.3)
MONOCYTES ABSOLUTE: 1.2 K/UL (ref 0–1.3)
MONOCYTES RELATIVE PERCENT: 11.9 %
MONOCYTES RELATIVE PERCENT: 12.6 %
NEUTROPHILS ABSOLUTE: 3.5 K/UL (ref 1.7–7.7)
NEUTROPHILS ABSOLUTE: 5.1 K/UL (ref 1.7–7.7)
NEUTROPHILS RELATIVE PERCENT: 53.5 %
NEUTROPHILS RELATIVE PERCENT: 63.2 %
NITRITE, URINE: NEGATIVE
PDW BLD-RTO: 13.3 % (ref 12.4–15.4)
PDW BLD-RTO: 13.8 % (ref 12.4–15.4)
PDW BLD-RTO: 13.9 % (ref 12.4–15.4)
PERFORMED ON: NORMAL
PH UA: 7.5 (ref 5–8)
PHOSPHORUS: 3.1 MG/DL (ref 2.5–4.9)
PLATELET # BLD: 119 K/UL (ref 135–450)
PLATELET # BLD: 174 K/UL (ref 135–450)
PLATELET # BLD: 186 K/UL (ref 135–450)
PMV BLD AUTO: 8 FL (ref 5–10.5)
PMV BLD AUTO: 8.1 FL (ref 5–10.5)
PMV BLD AUTO: 8.1 FL (ref 5–10.5)
POTASSIUM SERPL-SCNC: 4.3 MMOL/L (ref 3.5–5.1)
POTASSIUM SERPL-SCNC: 4.6 MMOL/L (ref 3.5–5.1)
POTASSIUM SERPL-SCNC: 4.6 MMOL/L (ref 3.5–5.1)
PRO-BNP: 2381 PG/ML (ref 0–449)
PROTEIN UA: 100 MG/DL
PROTHROMBIN TIME: 14 SEC (ref 9.8–13)
PROTHROMBIN TIME: 14.7 SEC (ref 9.8–13)
PROTHROMBIN TIME: 15.1 SEC (ref 9.8–13)
PROTHROMBIN TIME: 15.4 SEC (ref 9.8–13)
PROTHROMBIN TIME: 19.1 SEC (ref 9.8–13)
RBC # BLD: 4.65 M/UL (ref 4–5.2)
RBC # BLD: 4.71 M/UL (ref 4–5.2)
RBC # BLD: 4.92 M/UL (ref 4–5.2)
RBC UA: 48 /HPF (ref 0–4)
SODIUM BLD-SCNC: 138 MMOL/L (ref 136–145)
SPECIFIC GRAVITY UA: >1.03 (ref 1–1.03)
TOTAL CK: 56 U/L (ref 26–192)
TOTAL PROTEIN: 7.2 G/DL (ref 6.4–8.2)
TOTAL PROTEIN: 7.2 G/DL (ref 6.4–8.2)
TRIGL SERPL-MCNC: 141 MG/DL (ref 0–150)
TROPONIN: <0.01 NG/ML
TROPONIN: <0.01 NG/ML
URINE CULTURE, ROUTINE: NORMAL
URINE REFLEX TO CULTURE: YES
URINE TYPE: ABNORMAL
UROBILINOGEN, URINE: 1 E.U./DL
VLDLC SERPL CALC-MCNC: 28 MG/DL
WBC # BLD: 5.5 K/UL (ref 4–11)
WBC # BLD: 9.5 K/UL (ref 4–11)
WBC # BLD: 9.6 K/UL (ref 4–11)
WBC UA: 14 /HPF (ref 0–5)

## 2019-01-01 PROCEDURE — 85025 COMPLETE CBC W/AUTO DIFF WBC: CPT

## 2019-01-01 PROCEDURE — 85610 PROTHROMBIN TIME: CPT

## 2019-01-01 PROCEDURE — 6360000004 HC RX CONTRAST MEDICATION: Performed by: PHYSICIAN ASSISTANT

## 2019-01-01 PROCEDURE — 6370000000 HC RX 637 (ALT 250 FOR IP): Performed by: INTERNAL MEDICINE

## 2019-01-01 PROCEDURE — 82550 ASSAY OF CK (CPK): CPT

## 2019-01-01 PROCEDURE — 94760 N-INVAS EAR/PLS OXIMETRY 1: CPT

## 2019-01-01 PROCEDURE — 36415 COLL VENOUS BLD VENIPUNCTURE: CPT

## 2019-01-01 PROCEDURE — 99211 OFF/OP EST MAY X REQ PHY/QHP: CPT

## 2019-01-01 PROCEDURE — 92610 EVALUATE SWALLOWING FUNCTION: CPT

## 2019-01-01 PROCEDURE — 2580000003 HC RX 258: Performed by: INTERNAL MEDICINE

## 2019-01-01 PROCEDURE — 99285 EMERGENCY DEPT VISIT HI MDM: CPT

## 2019-01-01 PROCEDURE — 6360000002 HC RX W HCPCS: Performed by: INTERNAL MEDICINE

## 2019-01-01 PROCEDURE — 71045 X-RAY EXAM CHEST 1 VIEW: CPT

## 2019-01-01 PROCEDURE — 99223 1ST HOSP IP/OBS HIGH 75: CPT | Performed by: INTERNAL MEDICINE

## 2019-01-01 PROCEDURE — 93010 ELECTROCARDIOGRAM REPORT: CPT | Performed by: INTERNAL MEDICINE

## 2019-01-01 PROCEDURE — 99214 OFFICE O/P EST MOD 30 MIN: CPT | Performed by: FAMILY MEDICINE

## 2019-01-01 PROCEDURE — 96360 HYDRATION IV INFUSION INIT: CPT

## 2019-01-01 PROCEDURE — 96365 THER/PROPH/DIAG IV INF INIT: CPT

## 2019-01-01 PROCEDURE — 2060000000 HC ICU INTERMEDIATE R&B

## 2019-01-01 PROCEDURE — 2580000003 HC RX 258: Performed by: EMERGENCY MEDICINE

## 2019-01-01 PROCEDURE — 72125 CT NECK SPINE W/O DYE: CPT

## 2019-01-01 PROCEDURE — 97530 THERAPEUTIC ACTIVITIES: CPT

## 2019-01-01 PROCEDURE — 92526 ORAL FUNCTION THERAPY: CPT

## 2019-01-01 PROCEDURE — 83735 ASSAY OF MAGNESIUM: CPT

## 2019-01-01 PROCEDURE — 3E03317 INTRODUCTION OF OTHER THROMBOLYTIC INTO PERIPHERAL VEIN, PERCUTANEOUS APPROACH: ICD-10-PCS | Performed by: INTERNAL MEDICINE

## 2019-01-01 PROCEDURE — 99233 SBSQ HOSP IP/OBS HIGH 50: CPT | Performed by: PSYCHIATRY & NEUROLOGY

## 2019-01-01 PROCEDURE — 97110 THERAPEUTIC EXERCISES: CPT

## 2019-01-01 PROCEDURE — 70496 CT ANGIOGRAPHY HEAD: CPT

## 2019-01-01 PROCEDURE — 97166 OT EVAL MOD COMPLEX 45 MIN: CPT

## 2019-01-01 PROCEDURE — 83880 ASSAY OF NATRIURETIC PEPTIDE: CPT

## 2019-01-01 PROCEDURE — 83036 HEMOGLOBIN GLYCOSYLATED A1C: CPT

## 2019-01-01 PROCEDURE — 94669 MECHANICAL CHEST WALL OSCILL: CPT

## 2019-01-01 PROCEDURE — 99232 SBSQ HOSP IP/OBS MODERATE 35: CPT | Performed by: PSYCHIATRY & NEUROLOGY

## 2019-01-01 PROCEDURE — 94667 MNPJ CHEST WALL 1ST: CPT

## 2019-01-01 PROCEDURE — 84484 ASSAY OF TROPONIN QUANT: CPT

## 2019-01-01 PROCEDURE — 99284 EMERGENCY DEPT VISIT MOD MDM: CPT

## 2019-01-01 PROCEDURE — 87086 URINE CULTURE/COLONY COUNT: CPT

## 2019-01-01 PROCEDURE — 93005 ELECTROCARDIOGRAM TRACING: CPT | Performed by: EMERGENCY MEDICINE

## 2019-01-01 PROCEDURE — 92507 TX SP LANG VOICE COMM INDIV: CPT

## 2019-01-01 PROCEDURE — 81001 URINALYSIS AUTO W/SCOPE: CPT

## 2019-01-01 PROCEDURE — 86850 RBC ANTIBODY SCREEN: CPT

## 2019-01-01 PROCEDURE — 96361 HYDRATE IV INFUSION ADD-ON: CPT

## 2019-01-01 PROCEDURE — 96376 TX/PRO/DX INJ SAME DRUG ADON: CPT

## 2019-01-01 PROCEDURE — 80069 RENAL FUNCTION PANEL: CPT

## 2019-01-01 PROCEDURE — 70450 CT HEAD/BRAIN W/O DYE: CPT

## 2019-01-01 PROCEDURE — 93005 ELECTROCARDIOGRAM TRACING: CPT | Performed by: PHYSICIAN ASSISTANT

## 2019-01-01 PROCEDURE — 86900 BLOOD TYPING SEROLOGIC ABO: CPT

## 2019-01-01 PROCEDURE — G0444 DEPRESSION SCREEN ANNUAL: HCPCS | Performed by: FAMILY MEDICINE

## 2019-01-01 PROCEDURE — 99214 OFFICE O/P EST MOD 30 MIN: CPT | Performed by: NURSE PRACTITIONER

## 2019-01-01 PROCEDURE — 2000000000 HC ICU R&B

## 2019-01-01 PROCEDURE — 99223 1ST HOSP IP/OBS HIGH 75: CPT | Performed by: PSYCHIATRY & NEUROLOGY

## 2019-01-01 PROCEDURE — 70486 CT MAXILLOFACIAL W/O DYE: CPT

## 2019-01-01 PROCEDURE — 73502 X-RAY EXAM HIP UNI 2-3 VIEWS: CPT

## 2019-01-01 PROCEDURE — 80053 COMPREHEN METABOLIC PANEL: CPT

## 2019-01-01 PROCEDURE — 6360000002 HC RX W HCPCS: Performed by: EMERGENCY MEDICINE

## 2019-01-01 PROCEDURE — 92523 SPEECH SOUND LANG COMPREHEN: CPT

## 2019-01-01 PROCEDURE — 80076 HEPATIC FUNCTION PANEL: CPT

## 2019-01-01 PROCEDURE — 80048 BASIC METABOLIC PNL TOTAL CA: CPT

## 2019-01-01 PROCEDURE — 70498 CT ANGIOGRAPHY NECK: CPT

## 2019-01-01 PROCEDURE — 2580000003 HC RX 258

## 2019-01-01 PROCEDURE — 85027 COMPLETE CBC AUTOMATED: CPT

## 2019-01-01 PROCEDURE — 85730 THROMBOPLASTIN TIME PARTIAL: CPT

## 2019-01-01 PROCEDURE — 86901 BLOOD TYPING SEROLOGIC RH(D): CPT

## 2019-01-01 PROCEDURE — 6360000002 HC RX W HCPCS

## 2019-01-01 PROCEDURE — 2500000003 HC RX 250 WO HCPCS: Performed by: INTERNAL MEDICINE

## 2019-01-01 PROCEDURE — 80061 LIPID PANEL: CPT

## 2019-01-01 PROCEDURE — 97162 PT EVAL MOD COMPLEX 30 MIN: CPT

## 2019-01-01 PROCEDURE — 97535 SELF CARE MNGMENT TRAINING: CPT

## 2019-01-01 RX ORDER — 0.9 % SODIUM CHLORIDE 0.9 %
1000 INTRAVENOUS SOLUTION INTRAVENOUS ONCE
Status: COMPLETED | OUTPATIENT
Start: 2019-01-01 | End: 2019-01-01

## 2019-01-01 RX ORDER — ATORVASTATIN CALCIUM 80 MG/1
80 TABLET, FILM COATED ORAL NIGHTLY
Status: DISCONTINUED | OUTPATIENT
Start: 2019-01-01 | End: 2019-01-01 | Stop reason: HOSPADM

## 2019-01-01 RX ORDER — 0.9 % SODIUM CHLORIDE 0.9 %
50 INTRAVENOUS SOLUTION INTRAVENOUS ONCE
Status: COMPLETED | OUTPATIENT
Start: 2019-01-01 | End: 2019-01-01

## 2019-01-01 RX ORDER — SODIUM CHLORIDE 0.9 % (FLUSH) 0.9 %
10 SYRINGE (ML) INJECTION EVERY 12 HOURS SCHEDULED
Status: DISCONTINUED | OUTPATIENT
Start: 2019-01-01 | End: 2019-01-01 | Stop reason: HOSPADM

## 2019-01-01 RX ORDER — SIMVASTATIN 20 MG
20 TABLET ORAL NIGHTLY
Qty: 90 TABLET | Refills: 3 | Status: SHIPPED | OUTPATIENT
Start: 2019-01-01 | End: 2019-01-01 | Stop reason: SINTOL

## 2019-01-01 RX ORDER — ASPIRIN 81 MG/1
81 TABLET ORAL DAILY
Status: DISCONTINUED | OUTPATIENT
Start: 2019-01-01 | End: 2019-01-01 | Stop reason: HOSPADM

## 2019-01-01 RX ORDER — DEXTROSE MONOHYDRATE 25 G/50ML
12.5 INJECTION, SOLUTION INTRAVENOUS
Status: ACTIVE | OUTPATIENT
Start: 2019-01-01 | End: 2019-01-01

## 2019-01-01 RX ORDER — AMLODIPINE BESYLATE 5 MG/1
5 TABLET ORAL DAILY
Status: DISCONTINUED | OUTPATIENT
Start: 2019-01-01 | End: 2019-01-01 | Stop reason: HOSPADM

## 2019-01-01 RX ORDER — WARFARIN SODIUM 2.5 MG/1
2.5 TABLET ORAL DAILY
Status: DISCONTINUED | OUTPATIENT
Start: 2019-01-01 | End: 2019-01-01

## 2019-01-01 RX ORDER — METOPROLOL SUCCINATE 50 MG/1
TABLET, EXTENDED RELEASE ORAL
Qty: 90 TABLET | Refills: 3 | Status: SHIPPED | OUTPATIENT
Start: 2019-01-01

## 2019-01-01 RX ORDER — SODIUM CHLORIDE 0.9 % (FLUSH) 0.9 %
10 SYRINGE (ML) INJECTION EVERY 12 HOURS SCHEDULED
Status: DISCONTINUED | OUTPATIENT
Start: 2019-01-01 | End: 2019-01-01 | Stop reason: SDUPTHER

## 2019-01-01 RX ORDER — SODIUM CHLORIDE 0.9 % (FLUSH) 0.9 %
10 SYRINGE (ML) INJECTION PRN
Status: DISCONTINUED | OUTPATIENT
Start: 2019-01-01 | End: 2019-01-01 | Stop reason: SDUPTHER

## 2019-01-01 RX ORDER — ASPIRIN 81 MG/1
81 TABLET ORAL DAILY
Qty: 90 TABLET | Refills: 1 | Status: SHIPPED | OUTPATIENT
Start: 2019-01-01

## 2019-01-01 RX ORDER — ONDANSETRON 2 MG/ML
4 INJECTION INTRAMUSCULAR; INTRAVENOUS EVERY 6 HOURS PRN
Status: DISCONTINUED | OUTPATIENT
Start: 2019-01-01 | End: 2019-01-01 | Stop reason: HOSPADM

## 2019-01-01 RX ORDER — ASPIRIN 300 MG/1
300 SUPPOSITORY RECTAL DAILY
Status: DISCONTINUED | OUTPATIENT
Start: 2019-01-01 | End: 2019-01-01 | Stop reason: HOSPADM

## 2019-01-01 RX ORDER — METOPROLOL SUCCINATE 50 MG/1
TABLET, EXTENDED RELEASE ORAL
Qty: 90 TABLET | Refills: 0 | Status: SHIPPED | OUTPATIENT
Start: 2019-01-01 | End: 2019-01-01 | Stop reason: SDUPTHER

## 2019-01-01 RX ORDER — SODIUM CHLORIDE 9 MG/ML
INJECTION, SOLUTION INTRAVENOUS
Status: COMPLETED
Start: 2019-01-01 | End: 2019-01-01

## 2019-01-01 RX ORDER — SODIUM CHLORIDE 9 MG/ML
INJECTION, SOLUTION INTRAVENOUS CONTINUOUS
Status: ACTIVE | OUTPATIENT
Start: 2019-01-01 | End: 2019-01-01

## 2019-01-01 RX ORDER — SODIUM CHLORIDE 0.9 % (FLUSH) 0.9 %
10 SYRINGE (ML) INJECTION PRN
Status: DISCONTINUED | OUTPATIENT
Start: 2019-01-01 | End: 2019-01-01 | Stop reason: HOSPADM

## 2019-01-01 RX ADMIN — ASPIRIN 81 MG: 81 TABLET, COATED ORAL at 09:11

## 2019-01-01 RX ADMIN — Medication 10 ML: at 09:10

## 2019-01-01 RX ADMIN — Medication 10 ML: at 09:27

## 2019-01-01 RX ADMIN — SODIUM CHLORIDE: 9 INJECTION, SOLUTION INTRAVENOUS at 14:07

## 2019-01-01 RX ADMIN — ASPIRIN 81 MG: 81 TABLET, COATED ORAL at 09:27

## 2019-01-01 RX ADMIN — ATORVASTATIN CALCIUM 80 MG: 80 TABLET, FILM COATED ORAL at 21:58

## 2019-01-01 RX ADMIN — MICONAZOLE NITRATE: 20 POWDER TOPICAL at 21:16

## 2019-01-01 RX ADMIN — SODIUM CHLORIDE 50 ML: 9 INJECTION, SOLUTION INTRAVENOUS at 16:51

## 2019-01-01 RX ADMIN — AMLODIPINE BESYLATE 5 MG: 5 TABLET ORAL at 12:27

## 2019-01-01 RX ADMIN — MICONAZOLE NITRATE: 20 POWDER TOPICAL at 23:26

## 2019-01-01 RX ADMIN — ALTEPLASE 38.6 MG: KIT at 16:06

## 2019-01-01 RX ADMIN — ASPIRIN 81 MG: 81 TABLET, COATED ORAL at 20:17

## 2019-01-01 RX ADMIN — SODIUM CHLORIDE 1000 ML: 9 INJECTION, SOLUTION INTRAVENOUS at 13:18

## 2019-01-01 RX ADMIN — SODIUM CHLORIDE: 9 INJECTION, SOLUTION INTRAVENOUS at 12:44

## 2019-01-01 RX ADMIN — AMLODIPINE BESYLATE 5 MG: 5 TABLET ORAL at 09:11

## 2019-01-01 RX ADMIN — MICONAZOLE NITRATE: 20 POWDER TOPICAL at 12:28

## 2019-01-01 RX ADMIN — AMLODIPINE BESYLATE 5 MG: 5 TABLET ORAL at 08:51

## 2019-01-01 RX ADMIN — MICONAZOLE NITRATE: 20 POWDER TOPICAL at 09:27

## 2019-01-01 RX ADMIN — ATORVASTATIN CALCIUM 80 MG: 80 TABLET, FILM COATED ORAL at 23:26

## 2019-01-01 RX ADMIN — Medication 10 ML: at 12:28

## 2019-01-01 RX ADMIN — ATORVASTATIN CALCIUM 80 MG: 80 TABLET, FILM COATED ORAL at 21:15

## 2019-01-01 RX ADMIN — MICONAZOLE NITRATE: 20 POWDER TOPICAL at 08:10

## 2019-01-01 RX ADMIN — Medication 10 ML: at 21:29

## 2019-01-01 RX ADMIN — MICONAZOLE NITRATE: 20 POWDER TOPICAL at 22:20

## 2019-01-01 RX ADMIN — IOPAMIDOL 75 ML: 755 INJECTION, SOLUTION INTRAVENOUS at 15:16

## 2019-01-01 RX ADMIN — ENOXAPARIN SODIUM 40 MG: 40 INJECTION SUBCUTANEOUS at 09:10

## 2019-01-01 RX ADMIN — SODIUM CHLORIDE: 9 INJECTION, SOLUTION INTRAVENOUS at 21:11

## 2019-01-01 RX ADMIN — ENOXAPARIN SODIUM 40 MG: 40 INJECTION SUBCUTANEOUS at 20:17

## 2019-01-01 RX ADMIN — Medication 10 ML: at 08:57

## 2019-01-01 RX ADMIN — ENOXAPARIN SODIUM 40 MG: 40 INJECTION SUBCUTANEOUS at 09:27

## 2019-01-01 RX ADMIN — MICONAZOLE NITRATE: 20 POWDER TOPICAL at 11:46

## 2019-01-01 RX ADMIN — MICONAZOLE NITRATE: 20 POWDER TOPICAL at 02:21

## 2019-01-01 RX ADMIN — ATORVASTATIN CALCIUM 80 MG: 80 TABLET, FILM COATED ORAL at 21:29

## 2019-01-01 RX ADMIN — Medication 10 ML: at 21:12

## 2019-01-01 RX ADMIN — ALTEPLASE 4.3 MG: KIT at 16:03

## 2019-01-01 RX ADMIN — MICONAZOLE NITRATE: 20 POWDER TOPICAL at 09:00

## 2019-01-01 RX ADMIN — Medication 10 ML: at 21:15

## 2019-01-01 RX ADMIN — IOPAMIDOL 75 ML: 755 INJECTION, SOLUTION INTRAVENOUS at 12:17

## 2019-01-01 RX ADMIN — MICONAZOLE NITRATE: 20 POWDER TOPICAL at 21:59

## 2019-01-01 RX ADMIN — Medication 10 ML: at 07:29

## 2019-01-01 RX ADMIN — ENOXAPARIN SODIUM 40 MG: 40 INJECTION SUBCUTANEOUS at 12:27

## 2019-01-01 RX ADMIN — Medication 10 ML: at 23:26

## 2019-01-01 RX ADMIN — ASPIRIN 81 MG: 81 TABLET, COATED ORAL at 12:27

## 2019-01-01 ASSESSMENT — PAIN SCALES - PAIN ASSESSMENT IN ADVANCED DEMENTIA (PAINAD)
FACIALEXPRESSION: 0
FACIALEXPRESSION: 0
TOTALSCORE: 0
BREATHING: 0
BREATHING: 0
CONSOLABILITY: 0
BODYLANGUAGE: 0
FACIALEXPRESSION: 0
CONSOLABILITY: 0
FACIALEXPRESSION: 0
TOTALSCORE: 0
TOTALSCORE: 1
NEGVOCALIZATION: 1
BREATHING: 0
TOTALSCORE: 0
TOTALSCORE: 0
CONSOLABILITY: 0
FACIALEXPRESSION: 0
TOTALSCORE: 0
CONSOLABILITY: 0
NEGVOCALIZATION: 0
FACIALEXPRESSION: 0
NEGVOCALIZATION: 0
TOTALSCORE: 0
FACIALEXPRESSION: 0
BODYLANGUAGE: 0
BODYLANGUAGE: 0
NEGVOCALIZATION: 0
TOTALSCORE: 1
BREATHING: 0
CONSOLABILITY: 0
FACIALEXPRESSION: 0
BREATHING: 0
TOTALSCORE: 0
NEGVOCALIZATION: 0
BODYLANGUAGE: 0
CONSOLABILITY: 0
CONSOLABILITY: 0
BODYLANGUAGE: 0
FACIALEXPRESSION: 0
BREATHING: 0
CONSOLABILITY: 0
BREATHING: 0
FACIALEXPRESSION: 0
BODYLANGUAGE: 0
TOTALSCORE: 0
CONSOLABILITY: 0
FACIALEXPRESSION: 0
NEGVOCALIZATION: 0
BODYLANGUAGE: 0
NEGVOCALIZATION: 0
BODYLANGUAGE: 0
FACIALEXPRESSION: 0
BODYLANGUAGE: 0
NEGVOCALIZATION: 0
BODYLANGUAGE: 0
TOTALSCORE: 0
FACIALEXPRESSION: 0
FACIALEXPRESSION: 0
BODYLANGUAGE: 0
BODYLANGUAGE: 0
CONSOLABILITY: 0
CONSOLABILITY: 0
TOTALSCORE: 0
CONSOLABILITY: 1
TOTALSCORE: 0
CONSOLABILITY: 0
BODYLANGUAGE: 0
BODYLANGUAGE: 0
NEGVOCALIZATION: 0
BREATHING: 0
CONSOLABILITY: 0
FACIALEXPRESSION: 0
CONSOLABILITY: 0
BODYLANGUAGE: 0
NEGVOCALIZATION: 0
TOTALSCORE: 0
TOTALSCORE: 0
BREATHING: 0
BREATHING: 0
TOTALSCORE: 0
FACIALEXPRESSION: 0
CONSOLABILITY: 0
FACIALEXPRESSION: 0
CONSOLABILITY: 0
BREATHING: 0
CONSOLABILITY: 0
FACIALEXPRESSION: 0
CONSOLABILITY: 0
BREATHING: 0
CONSOLABILITY: 0
BREATHING: 0
NEGVOCALIZATION: 0
BREATHING: 0
BREATHING: 0
FACIALEXPRESSION: 0
CONSOLABILITY: 0
BODYLANGUAGE: 0
TOTALSCORE: 0
CONSOLABILITY: 0
CONSOLABILITY: 0
FACIALEXPRESSION: 0
CONSOLABILITY: 0
BREATHING: 0
NEGVOCALIZATION: 0
CONSOLABILITY: 0
NEGVOCALIZATION: 0
BODYLANGUAGE: 0
BODYLANGUAGE: 0
TOTALSCORE: 0
CONSOLABILITY: 0
FACIALEXPRESSION: 0
TOTALSCORE: 0
CONSOLABILITY: 0
TOTALSCORE: 0
FACIALEXPRESSION: 0
CONSOLABILITY: 0
CONSOLABILITY: 0
NEGVOCALIZATION: 0
FACIALEXPRESSION: 0
BODYLANGUAGE: 0
TOTALSCORE: 0
TOTALSCORE: 0
FACIALEXPRESSION: 0
TOTALSCORE: 0
NEGVOCALIZATION: 0
CONSOLABILITY: 0
BREATHING: 0
CONSOLABILITY: 0
TOTALSCORE: 0
NEGVOCALIZATION: 0
NEGVOCALIZATION: 0
BREATHING: 0
BODYLANGUAGE: 0
BREATHING: 0
NEGVOCALIZATION: 0
BREATHING: 0
TOTALSCORE: 0
BREATHING: 0
BODYLANGUAGE: 0
NEGVOCALIZATION: 0
FACIALEXPRESSION: 0
TOTALSCORE: 0
TOTALSCORE: 0
BODYLANGUAGE: 0
BODYLANGUAGE: 0
NEGVOCALIZATION: 0
TOTALSCORE: 0
NEGVOCALIZATION: 0
BODYLANGUAGE: 0
NEGVOCALIZATION: 0
BODYLANGUAGE: 0
BODYLANGUAGE: 0
BREATHING: 0
BREATHING: 0
NEGVOCALIZATION: 0
BREATHING: 0
BODYLANGUAGE: 0
BODYLANGUAGE: 0
BREATHING: 0
NEGVOCALIZATION: 0
NEGVOCALIZATION: 0
FACIALEXPRESSION: 0
TOTALSCORE: 0
BODYLANGUAGE: 0
NEGVOCALIZATION: 0
TOTALSCORE: 0
NEGVOCALIZATION: 0
BREATHING: 0
NEGVOCALIZATION: 0
NEGVOCALIZATION: 0
BREATHING: 0
TOTALSCORE: 0
TOTALSCORE: 0
NEGVOCALIZATION: 0
CONSOLABILITY: 0
FACIALEXPRESSION: 0
NEGVOCALIZATION: 0
FACIALEXPRESSION: 0
BREATHING: 0
TOTALSCORE: 0
CONSOLABILITY: 0
NEGVOCALIZATION: 0
FACIALEXPRESSION: 0
BODYLANGUAGE: 0
BODYLANGUAGE: 0
CONSOLABILITY: 0
FACIALEXPRESSION: 0
BODYLANGUAGE: 0
FACIALEXPRESSION: 0
NEGVOCALIZATION: 0
BODYLANGUAGE: 0
BREATHING: 0
NEGVOCALIZATION: 0
BODYLANGUAGE: 0
BREATHING: 0
NEGVOCALIZATION: 0
TOTALSCORE: 0
BREATHING: 0
BREATHING: 0
NEGVOCALIZATION: 0
FACIALEXPRESSION: 0
BODYLANGUAGE: 0
BREATHING: 0
BODYLANGUAGE: 0
BREATHING: 0
CONSOLABILITY: 0
TOTALSCORE: 0
FACIALEXPRESSION: 0
BREATHING: 0
BREATHING: 0
NEGVOCALIZATION: 0
CONSOLABILITY: 0
BREATHING: 0
CONSOLABILITY: 0
FACIALEXPRESSION: 0
TOTALSCORE: 0
BODYLANGUAGE: 0
BREATHING: 0
NEGVOCALIZATION: 0
TOTALSCORE: 0
FACIALEXPRESSION: 0
BREATHING: 0
FACIALEXPRESSION: 0
CONSOLABILITY: 0
FACIALEXPRESSION: 0
BREATHING: 0
BODYLANGUAGE: 0
FACIALEXPRESSION: 0

## 2019-01-01 ASSESSMENT — PAIN SCALES - WONG BAKER
WONGBAKER_NUMERICALRESPONSE: 0

## 2019-01-01 ASSESSMENT — PATIENT HEALTH QUESTIONNAIRE - PHQ9
9. THOUGHTS THAT YOU WOULD BE BETTER OFF DEAD, OR OF HURTING YOURSELF: 0
SUM OF ALL RESPONSES TO PHQ QUESTIONS 1-9: 15
SUM OF ALL RESPONSES TO PHQ9 QUESTIONS 1 & 2: 6
10. IF YOU CHECKED OFF ANY PROBLEMS, HOW DIFFICULT HAVE THESE PROBLEMS MADE IT FOR YOU TO DO YOUR WORK, TAKE CARE OF THINGS AT HOME, OR GET ALONG WITH OTHER PEOPLE: 0
8. MOVING OR SPEAKING SO SLOWLY THAT OTHER PEOPLE COULD HAVE NOTICED. OR THE OPPOSITE, BEING SO FIGETY OR RESTLESS THAT YOU HAVE BEEN MOVING AROUND A LOT MORE THAN USUAL: 0
SUM OF ALL RESPONSES TO PHQ QUESTIONS 1-9: 15
3. TROUBLE FALLING OR STAYING ASLEEP: 3
6. FEELING BAD ABOUT YOURSELF - OR THAT YOU ARE A FAILURE OR HAVE LET YOURSELF OR YOUR FAMILY DOWN: 0
7. TROUBLE CONCENTRATING ON THINGS, SUCH AS READING THE NEWSPAPER OR WATCHING TELEVISION: 0
1. LITTLE INTEREST OR PLEASURE IN DOING THINGS: 3
2. FEELING DOWN, DEPRESSED OR HOPELESS: 3
5. POOR APPETITE OR OVEREATING: 3
4. FEELING TIRED OR HAVING LITTLE ENERGY: 3

## 2019-01-01 ASSESSMENT — ENCOUNTER SYMPTOMS
NAUSEA: 0
BLOOD IN STOOL: 0
BACK PAIN: 0
VOMITING: 0
DIARRHEA: 1
SHORTNESS OF BREATH: 0
ABDOMINAL PAIN: 0

## 2019-01-01 ASSESSMENT — PAIN SCALES - GENERAL
PAINLEVEL_OUTOF10: 0

## 2019-05-01 NOTE — TELEPHONE ENCOUNTER
Warfarin prescription phoned into [facility] 24 to [address] under Dr. Xin Mast  Warfarin 2.5 mg tabs  Take 3.75mg on Mon only and 2.5mg all other days  90 days   3 refills

## 2019-06-12 NOTE — PROGRESS NOTES
Mrs. Balbir Bush is a 80 y.o. y/o female with history of Afib diagnosed and started warfarin about June 2015. Pertinent PMH: HTN, CAD, Mild MI with stent placement, TIA, possible IBS undiagnosed  She presents today for anticoagulation monitoring and adjustment    Patient Reported Findings:  Yes     No  [x]   []       Patient verifies current dosing regimen as listed  []   [x]       S/S bleeding/bruising/swelling/SOB  []   [x]       Blood in urine or stool  []   [x]       Procedures scheduled in the future at this time  []   [x]       Missed Dose   []   [x]       Extra Dose  []   [x]       Change in medications   []   [x]       Change in health/diet/appetite   []   [x]       Change in alcohol use  []   [x]       Change in activity  []   [x]       Hospital admission  []   [x]       Emergency department visit  []   [x]       Other complaints She c/o of constant dizziness or blurred vision to the degree that she is not using her walker anymore and presents in a wheelchair. She might try OTC Meclizine to see if that may help with her symptoms. Clinical Outcomes:  Yes     No  []   [x]       Major bleeding event  []   [x]       Thromboembolic event    Duration of warfarin Therapy: indefinitely  INR Range:  2.0-3.0  She presents today accompanied, as usual, by her son Radha Marie. Her and her son deny any changes in her diet or medications. INR 2.3 today   Continue same weekly dose to 3.75mg on Mondays and of 2.5mg all other days  Encouraged to maintain a consistency of vegetables/salads.   Recheck INR in 6 weeks on 7/24    Referring cardiologist is Dr. Dafne Oates  INR (no units)   Date Value   06/12/2019 2.3   05/01/2019 2.1   03/20/2019 2.7   02/20/2019 2.0   08/29/2018 2.5   07/18/2018 2.2   05/30/2018 2.4   04/18/2018 2

## 2019-08-11 NOTE — ED PROVIDER NOTES
Mouth/Throat: No oropharyngeal exudate. There is a very small scabbed over lesion at the tip of the nose. No septal hematoma, CSF rhinorrhea, raccoon eyes or barrios sign. Eyes: Pupils are equal, round, and reactive to light. EOM are normal. Right eye exhibits no discharge. Left eye exhibits no discharge. Neck: Normal range of motion. Cardiovascular: Exam reveals no gallop and no friction rub. No murmur heard. Irregularly irregular rate   Pulmonary/Chest: Effort normal and breath sounds normal. No stridor. No respiratory distress. She has no wheezes. She has no rales. Abdominal: Soft. Bowel sounds are normal. She exhibits no distension and no mass. There is no tenderness. There is no rebound and no guarding. No hernia. Musculoskeletal: Normal range of motion. She exhibits no edema, tenderness or deformity. Gross full range of motion throughout all 4 extremities. No midline tenderness or step-off in the neck or back. Neurological: She is alert and oriented to person, place, and time. No cranial nerve deficit. Cranial nerves II through XII grossly intact. No obvious weakness or sensory motor deficits. Skin: Skin is warm and dry. No rash noted. She is not diaphoretic. No erythema. Psychiatric: She has a normal mood and affect. Her behavior is normal.   Nursing note and vitals reviewed.       DIAGNOSTIC RESULTS   LABS:    Labs Reviewed   CBC WITH AUTO DIFFERENTIAL - Abnormal; Notable for the following components:       Result Value    Hemoglobin 16.2 (*)     Hematocrit 49.2 (*)     Platelets 050 (*)     All other components within normal limits    Narrative:     Performed at:  [facility] [address], [place], [address]   Phone [phone]   BASIC METABOLIC PANEL - Abnormal; Notable for the following components:    CO2 19 (*)     Glucose 117 (*)     GFR Non- 51 (*)     All other components within normal limits    Narrative:

## 2019-08-13 NOTE — TELEPHONE ENCOUNTER
Provided pt's son with [employer] and [employer] contact information. Provided number for [facility] per his request to call and ask questions. Encouraged if interested in Placement to apply for Institutional medicaid at [address] [place] [address] (provided number). Will f/u in next 2-3 days and check on status. Offered appt- Declined, he will talk to his mother and will call back and schedule if she agrees. Encounter closed.

## 2019-08-21 NOTE — ED PROVIDER NOTES
patient does not cooperate    Motor-Arm-Left:  0 - no drift, limb holds 90 (or 45) degrees for full 10 seconds    Motor-Leg-Left:  0 - no drift; leg holds 30 degree position for full 5 seconds -difficult to evaluate. Patient does not cooperate appropriately. Motor-Arm-Right:  3 - no effort against gravity, limb falls    Motor-Leg-Right:  3 - no effort against gravity; leg falls to bed immediately    Limb Ataxia: -difficult to evaluate. Patient does not cooperate appropriately. Sensory: -difficult to evaluate. Patient does not cooperate appropriately. Best Language:  2 - severe aphasia; all communication is through fragmentary expression; great need for inference, questioning, and guessing by the listener. Range of information that can be exchanged is limited; listener carries burden of communication. Examiner cannot identify materials provided from patient response. - pt with receptive and expressive aphasia - when asking her questions she does not respond appropriately. Dysarthria:  0 - normal    Extinction and Inattention:  2 - profound da-inattention or da- inattention to more than one modality. Does not recognize own hand or orients only to one side of space - da-inattention R side    NIHSS = 16      EKG: EKG interpretation by ED physician: Left axis deviation, A. fib at a rate of 83 bpm.  Nonspecific anterior lateral and inferior T wave changes. Similar to previous EKG from 8/11/2019. Stroke alert initiated upon arrival - last seen normal at 1 PM - seen again by family at 5. No recent surgeries bite state on all she has had any recent strokes in the last month and a half. Case was discussed with stroke neurologist.  M1 occlusion seen. Stroke neurologist spoke with family members on the phone discussing risks and benefits. Note that she is on Coumadin but she is subtherapeutic at 1.3.   The stroke neurologist will discuss the case with the [address] [place] [address] regarding Belen Blas, DO  08/21/19 [address] Sona Friendly, DO  08/21/19 [address] Sona Friendly, DO  08/21/19 1742

## 2019-08-21 NOTE — ED NOTES
Stroke Team Phone Note:    I was called about this 79 yo woman with probable stroke. She was not seen in person as  I have been attending to other stroke cases at outside hospitals. She has atrial fibrillation and is on warfarin but her INR is subtherapeutic today. She had a distant stroke treated with tPA. She had witnessed onset of a left MCA syndrome today at about 1:15 pm.  I discussed the case with her son by phone. She has been declining recently, more trouble with walking and general function, with several \"soft\" falls. He wondered if she has had \"mini-strokes\" in the past few months given her decline but is not sure and there has been no definitive diagnosis. However she has declined enough that family is trying to arrange SNF placement. Based upon our discussion, her baseline mRS is 3-4. I reviewed her imaging remotely. Her head CT shows atrophy, white matter disease, and old ischemic changes. Her CTA shows a distal left M1 occlusion. Her INR is 1.3    Impression:    Acute left MCA ischemic stroke, presumably cardioembolic. Recommendation:    1. I discussed risks/benefits/alternatives of IV tPA with her son. If she has had a stroke in the last three months, this would be a relative contraindication to tPA and would likely increase bleeding risk. However this has not been firmly established and her new deficits are significant. For this reason, he wished to proceed with tPA, which I felt was reasonable. 2.  I discussed the case with Dr. Adrianne Alas of [address] [place] [address] Neurosurgery regarding possible embolectomy. He recommended against this procedure given her age and pre-existing disability. I felt this was a reasonable decision and within the standard of care in this situation. 3.  Admit to ICU, post-tPA protocol. 4.  No antiplatelets or anticoagulants x 24 hours. 5.  Keeps SBP < 185 and DBP < 105. 6.  Repeat head CT or MRI tomorrow afternoon, sooner if she worsens.     7.

## 2019-08-21 NOTE — ED PROVIDER NOTES
http://pubs. rsna.org/doi/full/10.1148/radiol. [id number]     Result Date: 8/21/2019  EXAMINATION: CTA OF THE NECK; CTA OF THE HEAD WITH CONTRAST 8/21/2019 3:15 pm: TECHNIQUE: CTA of the neck was performed with the administration of intravenous contrast. Multiplanar reformatted images are provided for review. MIP images are provided for review. Stenosis of the internal carotid arteries measured using NASCET criteria. Dose modulation, iterative reconstruction, and/or weight based adjustment of the mA/kV was utilized to reduce the radiation dose to as low as reasonably achievable.; CTA of the head/brain was performed with the administration of intravenous contrast. Multiplanar reformatted images are provided for review. MIP images are provided for review. Dose modulation, iterative reconstruction, and/or weight based adjustment of the mA/kV was utilized to reduce the radiation dose to as low as reasonably achievable. COMPARISON: None. HISTORY: ORDERING SYSTEM PROVIDED HISTORY: speech difficulty TECHNOLOGIST PROVIDED HISTORY: Has a \"code stroke\" or \"stroke alert\" been called? ->Yes Reason for Exam: stroke alert Acuity: Unknown Type of Exam: Unknown; ORDERING SYSTEM PROVIDED HISTORY: speech difficutly TECHNOLOGIST PROVIDED HISTORY: Has a \"code stroke\" or \"stroke alert\" been called? ->Yes Reason for Exam: stroke alert Acuity: Unknown Type of Exam: Unknown FINDINGS: CTA NECK: AORTIC ARCH/ARCH VESSELS: There is a normal branch pattern of the aortic arch. No significant stenosis is seen of the innominate artery or subclavian arteries. CAROTID ARTERIES: The common carotid arteries are normal in appearance without evidence of a flow limiting stenosis. There is 50% stenosis of the proximal right cervical ICA due to calcified and noncalcified plaque. No dissection or arterial injury is seen.  VERTEBRAL ARTERIES: The vertebral arteries both arise from the subclavian arteries and are normal in caliber without evidence of flow http://pubs. rsna.org/doi/full/10.1148/radiol. 6638054955     Result Date: 8/21/2019  EXAMINATION: CTA OF THE NECK; CTA OF THE HEAD WITH CONTRAST 8/21/2019 3:15 pm: TECHNIQUE: CTA of the neck was performed with the administration of intravenous contrast. Multiplanar reformatted images are provided for review. MIP images are provided for review. Stenosis of the internal carotid arteries measured using NASCET criteria. Dose modulation, iterative reconstruction, and/or weight based adjustment of the mA/kV was utilized to reduce the radiation dose to as low as reasonably achievable.; CTA of the head/brain was performed with the administration of intravenous contrast. Multiplanar reformatted images are provided for review. MIP images are provided for review. Dose modulation, iterative reconstruction, and/or weight based adjustment of the mA/kV was utilized to reduce the radiation dose to as low as reasonably achievable. COMPARISON: None. HISTORY: ORDERING SYSTEM PROVIDED HISTORY: speech difficulty TECHNOLOGIST PROVIDED HISTORY: Has a \"code stroke\" or \"stroke alert\" been called? ->Yes Reason for Exam: stroke alert Acuity: Unknown Type of Exam: Unknown; ORDERING SYSTEM PROVIDED HISTORY: speech difficutly TECHNOLOGIST PROVIDED HISTORY: Has a \"code stroke\" or \"stroke alert\" been called? ->Yes Reason for Exam: stroke alert Acuity: Unknown Type of Exam: Unknown FINDINGS: CTA NECK: AORTIC ARCH/ARCH VESSELS: There is a normal branch pattern of the aortic arch. No significant stenosis is seen of the innominate artery or subclavian arteries. CAROTID ARTERIES: The common carotid arteries are normal in appearance without evidence of a flow limiting stenosis. There is 50% stenosis of the proximal right cervical ICA due to calcified and noncalcified plaque. No dissection or arterial injury is seen.  VERTEBRAL ARTERIES: The vertebral arteries both arise from the subclavian arteries and are normal in caliber without evidence of flow limiting stenosis or dissection. SOFT TISSUES: Emphysematous changes are present within both lungs with biapical pleural-parenchymal thickening. There is also a nodule or opacified bronchiole within the left upper lobe measuring up to 1 cm in diameter. BONES: Degenerative changes of the spine are noted. CTA HEAD: ANTERIOR CIRCULATION: The internal carotid arteries are normal in course and caliber without focal stenosis. The anterior cerebral demonstrate no focal stenosis. The right MCA is within normal limits. The distal M1 segment of the left middle cerebral artery is occluded. Bilateral posterior communicating arteries are present. POSTERIOR CIRCULATION: The posterior cerebral arteries demonstrate no focal stenosis. The vertebral and basilar arteries appear unremarkable. BRAIN: See separately dictated noncontrast head CT report. Distal M1 segment occlusion of the left MCA. Pulmonary emphysema with nodule or opacified bronchiole within the left upper lobe. PROCEDURES   Unless otherwise noted below, none     Procedures    CRITICAL CARE TIME   Critical Care  There was a high probability of life-threatening clinical deterioration in the patient's condition requiring my urgent intervention. Total critical care time with the patient was 35 minutes excluding separately reportable procedures.   Critical care required due to patients acute CVA, requiring TPA multiple phone calls to stroke neurology      CONSULTS:  [facility] DIFFERENTIAL DIAGNOSIS/MDM:   Vitals:    Vitals:    08/21/19 1715 08/21/19 1730 08/21/19 1745 08/21/19 1800   BP: (!) 164/82 139/89 (!) 146/81 (!) 155/71   Pulse: 71 80 82 84   Resp: 16 18 17 16   SpO2: 99% 97% 98% 99%   Weight:       Height:           Patient was given thefollowing medications:  Medications   sodium chloride flush 0.9 % injection 10 mL (has no administration in time range)   sodium chloride

## 2019-08-21 NOTE — PROGRESS NOTES
(arteriosclerotic heart disease)     Atrial fibrillation (HCC)     Cancer (HCC)     SKIN PRE CANCEROUS    Carotid stenosis     diverticulitis     DJD (degenerative joint disease)     Hyperlipidemia     Hypertension     Macular degeneration     right    Unspecified cerebral artery occlusion with cerebral infarction        Social History     Tobacco Use    Smoking status: Former Smoker     Last attempt to quit: 1986     Years since quittin.1    Smokeless tobacco: Never Used   Substance Use Topics    Alcohol use: No       Family History   Problem Relation Age of Onset    Heart Attack Other        OBJECTIVE:  /70   Pulse 67   Wt 105 lb (47.6 kg)   LMP 1975   SpO2 98%   BMI 19.20 kg/m²   GEN:  in NAD  HEENT:  NCAT, TMs:normal and throat: clear  NECK:  Supple without adenopathy. CV:  IRRegular rate and rhythm, S1 and S2 normal  PULM:  Chest is clear, no wheezing ,  symmetric air entry throughout both lung fields. ABD: Soft, NT, no masses appreciated  EXT: No rash or edema  NEURO: Alert oriented ×3, unable to get up out of her wheelchair without assistance, very weak and arthritic, does not wish to pursue physical therapy    ASSESSMENT/PLAN:  1. TIA (transient ischemic attack)  - DME Order for Wheel Chair as OP  - ECHO Complete 2D W Doppler W Color; Future    2. Dizziness  Eat better and hydrate more  - DME Order for Wheel Chair as OP  - ECHO Complete 2D W Doppler W Color; Future    3. Persistent atrial fibrillation (HCC)  Continue Coumadin  - DME Order for Wheel Chair as OP  - ECHO Complete 2D W Doppler W Color; Future    4.  Acute CVA (cerebrovascular accident) ([address]  History of in the past  - DME Order for Wheel Chair as OP      25 minutes spent with the pt face-to-face,  >50% spent reviewing test results and discussing plan of care and counseled on contacting hospice, need living will and power of , recommend cardiac echo, continue Coumadin, Tylenol as needed, follow-up

## 2019-08-22 NOTE — H&P
hyperlipidemia, who presents to the emergency room for evaluation of difficulty with speech, right upper and lower extremity weakness. She was last known to be normal around 1 PM.  In the emergency room, patient was diagnosed with acute CVA and received TPA at 1603 hrs. Past Medical History:      Diagnosis Date    ASHD (arteriosclerotic heart disease)     Atrial fibrillation (HCC)     Cancer (HCC)     SKIN PRE CANCEROUS    Carotid stenosis     diverticulitis     DJD (degenerative joint disease)     Hyperlipidemia     Hypertension     Macular degeneration     right    Unspecified cerebral artery occlusion with cerebral infarction        Past Surgical History:      Procedure Laterality Date    CHOLECYSTECTOMY  1982    CORONARY ANGIOPLASTY      STENT    EYE SURGERY      BILATERAL CATARACTS    FRACTURE SURGERY  11/78    JOINT REPLACEMENT  10/17/2005    RT    SINUS SURGERY  1996    SKIN BIOPSY      PRE CANCEROUS FACE    SKIN CANCER EXCISION  8/23/12    EXCSION BASAL CELL CA RIGHT TEMPLE, FROZEN SECTION REPAIR       Medications (prior to admission):  Prior to Admission medications    Medication Sig Start Date End Date Taking? Authorizing Provider   metoprolol succinate (TOPROL XL) 50 MG extended release tablet TAKE 1 TABLET BY MOUTH EVERY DAY 3/15/19  Yes Thai Stinson APRN - CNP   warfarin (COUMADIN) 2.5 MG tablet Take 2.5 mg by mouth See Admin Instructions Dose adjusted by [facility] [address] [facility]   Yes Historical Provider, MD   acetaminophen (APAP EXTRA STRENGTH) 500 MG tablet Take 2 tablets by mouth every 6 hours as needed for Pain 8/16/17  Yes Severo Porto, MD       Allergy(ies):  Tramadol    Social History:  TOBACCO:  reports that she quit smoking about 33 years ago. She has never used smokeless tobacco.  ETOH:  reports that she does not drink alcohol.     Family History:      Problem Relation Age of Onset    Heart Attack Other      Review of Systems:  Unable to obtain as patient is Nodule size greater than 8 mm In a low-risk patient, consider CT at 3 months, PET/CT, or tissue sampling. In a high-risk patient, consider CT at 3 months, PET/CT, or tissue sampling. - Low risk patients include individuals with minimal or absent history of smoking and other known risk factors. - High risk patients include individuals with a history or smoking or known risk factors. Radiology 2017 http://pubs. rsna.org/doi/full/10.1148/radiol. [id number]     Result Date: 8/21/2019  EXAMINATION: CTA OF THE NECK; CTA OF THE HEAD WITH CONTRAST 8/21/2019 3:15 pm: TECHNIQUE: CTA of the neck was performed with the administration of intravenous contrast. Multiplanar reformatted images are provided for review. MIP images are provided for review. Stenosis of the internal carotid arteries measured using NASCET criteria. Dose modulation, iterative reconstruction, and/or weight based adjustment of the mA/kV was utilized to reduce the radiation dose to as low as reasonably achievable.; CTA of the head/brain was performed with the administration of intravenous contrast. Multiplanar reformatted images are provided for review. MIP images are provided for review. Dose modulation, iterative reconstruction, and/or weight based adjustment of the mA/kV was utilized to reduce the radiation dose to as low as reasonably achievable. COMPARISON: None. HISTORY: ORDERING SYSTEM PROVIDED HISTORY: speech difficulty TECHNOLOGIST PROVIDED HISTORY: Has a \"code stroke\" or \"stroke alert\" been called? ->Yes Reason for Exam: stroke alert Acuity: Unknown Type of Exam: Unknown; ORDERING SYSTEM PROVIDED HISTORY: speech difficutly TECHNOLOGIST PROVIDED HISTORY: Has a \"code stroke\" or \"stroke alert\" been called? ->Yes Reason for Exam: stroke alert Acuity: Unknown Type of Exam: Unknown FINDINGS: CTA NECK: AORTIC ARCH/ARCH VESSELS: There is a normal branch pattern of the aortic arch.  No significant stenosis is seen of the innominate artery or subclavian arteries. CAROTID ARTERIES: The common carotid arteries are normal in appearance without evidence of a flow limiting stenosis. There is 50% stenosis of the proximal right cervical ICA due to calcified and noncalcified plaque. No dissection or arterial injury is seen. VERTEBRAL ARTERIES: The vertebral arteries both arise from the subclavian arteries and are normal in caliber without evidence of flow limiting stenosis or dissection. SOFT TISSUES: Emphysematous changes are present within both lungs with biapical pleural-parenchymal thickening. There is also a nodule or opacified bronchiole within the left upper lobe measuring up to 1 cm in diameter. BONES: Degenerative changes of the spine are noted. CTA HEAD: ANTERIOR CIRCULATION: The internal carotid arteries are normal in course and caliber without focal stenosis. The anterior cerebral demonstrate no focal stenosis. The right MCA is within normal limits. The distal M1 segment of the left middle cerebral artery is occluded. Bilateral posterior communicating arteries are present. POSTERIOR CIRCULATION: The posterior cerebral arteries demonstrate no focal stenosis. The vertebral and basilar arteries appear unremarkable. BRAIN: See separately dictated noncontrast head CT report. Distal M1 segment occlusion of the left MCA. Pulmonary emphysema with nodule or opacified bronchiole within the left upper lobe. Cta Head W Contrast  Result Date: 8/11/2019  EXAMINATION: CTA OF THE HEAD WITH CONTRAST; CTA OF THE NECK 8/11/2019 11:58 am; 8/11/2019 11:57 am TECHNIQUE: CTA of the head/brain was performed with the administration of intravenous contrast. Multiplanar reformatted images are provided for review. MIP images are provided for review.  Dose modulation, iterative reconstruction, and/or weight based adjustment of the mA/kV was utilized to reduce the radiation dose to as low as reasonably achievable.; CTA of the neck was performed with the administration of intravenous contrast. Multiplanar reformatted images are provided for review. MIP images are provided for review. Stenosis of the internal carotid arteries measured using NASCET criteria. Dose modulation, iterative reconstruction, and/or weight based adjustment of the mA/kV was utilized to reduce the radiation dose to as low as reasonably achievable. 3D reconstructed images were performed on a separate workstation and provided for review. COMPARISON: None HISTORY: ORDERING SYSTEM PROVIDED HISTORY: dizziness TECHNOLOGIST PROVIDED HISTORY: Has a \"code stroke\" or \"stroke alert\" been called? ->No Reason for Exam: fell  dizziness Acuity: Acute Type of Exam: Initial; ORDERING SYSTEM PROVIDED HISTORY: dizziness TECHNOLOGIST PROVIDED HISTORY: Has a \"code stroke\" or \"stroke alert\" been called? ->No FINDINGS: CTA NECK: AORTIC ARCH/ARCH VESSELS: No dissection or arterial injury. No significant stenosis of the brachiocephalic or subclavian arteries. CAROTID ARTERIES: Right internal carotid artery proximal 50% stenosis. Left common and internal carotid arteries are patent. VERTEBRAL ARTERIES: No dissection, arterial injury, or significant stenosis. SOFT TISSUES: There is left upper lobe bronchiectasis. There is bilateral pleuroparenchymal scarring. No cervical or superior mediastinal lymphadenopathy. The larynx and pharynx are unremarkable. No acute abnormality of the salivary and thyroid glands. BONES: Cervical degenerative disease without acute osseous abnormality. CTA HEAD: ANTERIOR CIRCULATION: No significant stenosis of the intracranial internal carotid, anterior cerebral, or middle cerebral arteries. No aneurysm. POSTERIOR CIRCULATION: No significant stenosis of the vertebral, basilar, or posterior cerebral arteries. No aneurysm. OTHER: No dural venous sinus thrombosis on this non-dedicated study. BRAIN: No mass effect or midline shift. No extra-axial fluid collection. The gray-white differentiation is maintained.

## 2019-08-22 NOTE — PROGRESS NOTES
Speech Language/Pathology   SPEECH LANGUAGE AND CLINICAL BEDSIDE SWALLOWING EVALUATION   Speech Therapy Department     Patient Name:  Jesenia Haji  :  1921  Pain level:Denies   Medical Diagnosis:  Acute CVA (cerebrovascular accident) ([facility] [I63.9]  Acute CVA (cerebrovascular accident) ([facility] [address] [I63.9]    HPI  1. \" Acute CVA status post TPA on 2019 at 1603 hrs. Refused recommendation for MRI-brain; very claustrophobic per children. 2. Expressive aphasia, right upper extremity weakness, bilateral lower extremity weakness, likely secondary to #1.  3. Acute encephalopathy likely secondary to underlying stroke. 4. History of atrial fibrillation (on chronic anticoagulation with Coumadin, INR subtherapeutic at 1.3 on presentation to the ER). Other comorbidities: History of carotid stenosis, hyperlipidemia, essential hypertension, prior history of CVA, obesity with BMI of 45 kg/m². \"    CTA head:  Impression   Distal M1 segment occlusion of the left MCA.       Pulmonary emphysema with nodule or opacified bronchiole within the left upper   lobe.         SLP eval and treat orders received pt with new CVA s/p TPA. Per discussion with son and chart review, pt with prior CVA in . Initially post CVA in  pt had dysphagia and was on honey thick liquids. Son reported that most recently pt did not demonstrate any trouble swallowing. He did report that she often had trouble getting some of her words out but was largely able to communicate functionally. CLINICAL SWALLOW EVALUATION:  Dysphagia Treatment Diagnosis: Moderate Oropharyngeal Dysphagia     Impressions: Pt was positioned upright in bed on RA. Vocal quality was clear. Dentition was adequate. Pt able to complete oral mechanism examination when given visual model. ROM/coordination appeared mildly reduced. Various consistency trials were provided to assess swallow function.  Dysphagia was characterized by decreased A-P propulsion, suspected Aphasia     Speech Language Impressions: Pt was awake and alert for assessment. No dysarthria was noted. Fluent expressive aphasia was assessed with fluent paraphasias, inability to complete automatic speech tasks and inability to complete repetition. Receptive aphasia was suspected as pt was unable to follow simple one step commands without visual model. Oral Motor exam/Motor Speech:  -Grossly functional, no dysarthria assessed      Verbal Expression:   -Unable to state name   -Unable to complete repetition of simple words or name   -Confrontational naming with 0% accuracy   -Unable to complete automatic speech tasks despite max cues   -Intermittent spontaneous words/phrases of \"no, never\" and \"you\" were intermittently noted   -Pt unable to communicate simple wants/needs     Auditory Comprehension:   -Followed 1 step verbal commands with 0% accuracy. Improved to 75% accuracy given physical model   -Unable to state yes/no or utilize head nod in response to simple yes/no questions     Cognitive-Linguistic:   -Unable to assess at this time due to severity of aphasia. Plan: Speech therapy 3-5 times a weeks for speech-language treatment, and dysphagia treatment     Discharge Recommendations:  Pt will benefit from continued skilled Speech Therapy for Speech and Dysphagia services, prior to returning home. Prior Status: Prior hx of CVA with prior hx of dysphagia and expressive deficits. Speech Language Short-term goals: 1. Pt will improve verbal expression via graded tasks to 80%   2. Pt will improve auditory processing/comprehension of commands and questions to 80%, via graded tasks   3. Pt will tolerate ongoing cognitive linguistic assessment with goals to be established as indicated     Patient/Family Education:Education, results and recommendations given to the Pt and nurse, who verbalized understanding    Timed Code Treatment:  0 minutes     Total Treatment Time:  40 [place] [address] [facility] #[id number]  Speech Language Pathologist

## 2019-08-22 NOTE — PROGRESS NOTES
Pt uncooperative with NIHSS assessment. Appears agitated. Pt remains aphasic, but clearly states \"no, never\". Daughter-in-law at bedside.

## 2019-08-22 NOTE — PROGRESS NOTES
Nutrition Assessment    Type and Reason for Visit: Initial, Consult    Nutrition Recommendations:   1. Continue Dysphagia 1 pureed diet  2. RD ordered magic Cup TID- chocolate  3. Encourage po    Nutrition Assessment: Consult for Onofre. Pt unable to speak at this time. Pt dx with CVA. Per son, pt was not eating well pta. Pt wt is actually 113lbs. Per son pt has not lost any wt, but po continues to be poor. Pt is currently at nutritionally compromised d/t poor intake. Malnutrition Assessment:  · Malnutrition Status: At risk for malnutrition  · Context: Chronic illness  · Findings of the 6 clinical characteristics of malnutrition (Minimum of 2 out of 6 clinical characteristics is required to make the diagnosis of moderate or severe Protein Calorie Malnutrition based on AND/ASPEN Guidelines):  1. Energy Intake-Less than or equal to 75% of estimated energy requirement, Greater than or equal to 7 days    2. Weight Loss-No significant weight loss,    3. Fat Loss-No significant subcutaneous fat loss,    4. Muscle Loss-No significant muscle mass loss,    5. Fluid Accumulation-No significant fluid accumulation,    6.   Strength-Not measured    Nutrition Risk Level: High    Nutrient Needs:  · Estimated Daily Total Kcal: 1830-6518  · Estimated Daily Protein (g): 61-77 gms(1.2-1.5 gms)  · Estimated Daily Total Fluid (ml/day): 1 ml/kcal    Nutrition Diagnosis:   · Problem: Inadequate energy intake  · Etiology: related to Insufficient energy/nutrient consumption     Signs and symptoms:  as evidenced by Diet history of poor intake    Objective Information:  · Nutrition-Focused Physical Findings: No edema  · Wound Type: None  · Current Nutrition Therapies:  · Oral Diet Orders: Dysphagia Pureed (Dysphagia 1)   · Oral Diet intake: (No po at this time)  · Oral Nutrition Supplement (ONS) Orders: None  · Anthropometric Measures:  · Ht: 5' 2\" (157.5 cm)   · Current Body Wt: 113 lb (51.3 kg)  · BMI Classification: BMI 18.5 - 24.9 Normal Weight    Nutrition Interventions:   Continue current diet, Start ONS  Continued Inpatient Monitoring, Speech Therapy    Nutrition Evaluation:   · Evaluation: Goals set   · Goals: PO 50% or more at meals/supp    · Monitoring: Meal Intake, Supplement Intake, Weight, Pertinent Labs, Chewing/Swallowing      Electronically signed by Mary Jo Ndiaye RD, CNSC, LD on 8/22/19 at 10:40 AM    Contact Number: [phone]

## 2019-08-23 NOTE — PROGRESS NOTES
Hyperlipidemia, Hypertension, Macular degeneration, and Unspecified cerebral artery occlusion with cerebral infarction. has a past surgical history that includes Cholecystectomy (); fracture surgery (); sinus surgery (); Skin cancer excision (12); Coronary angioplasty; joint replacement (10/17/2005); eye surgery; and skin biopsy. Restrictions  Restrictions/Precautions  Restrictions/Precautions: Fall Risk, Modified Diet(high fall risk; dysphagia diet with thins, pureed, no straw)  Required Braces or Orthoses?: No  Position Activity Restriction  Other position/activity restrictions: Suyapa Rivero is a 80 y.o. female who presents to the emergency department today by EMS for possible stroke. The patient's son and daughter-in-law are in the room, and they provide the history. Son states that he took the patient to her primary care physician's office today, and he states that he dropped her off around 1 PM this afternoon. He states that she was acting at baseline at that time. The daughter-in-law states that she saw her on 80, and at the time she was not making sense with her words, and she states that she did not recognize who she was. tPA given 19     Vision/Hearing  Vision: Impaired(Macular degeneration at baseline. Unable to answer visual history)       Subjective  General  Chart Reviewed: Yes  Additional Pertinent Hx: HTN, hyperlipidemia, macular degeneration, R hip pinning 2x  Family / Caregiver Present: No  Diagnosis: Acute L CVA  Follows Commands: Impaired  Other (Comment): Pt followed few commands for mobility  General Comment  Comments: Pt supine in bed upon arrival; agreeable to PT/OT  Subjective  Subjective: Pt speaks few intelligible words, \"thank you\", \"hell\";  Expressive aphasia  Pain Screening  Patient Currently in Pain: No          Orientation  Orientation  Overall Orientation Status: Impaired(expressive aphasia; unable to state name and )     Social/Functional

## 2019-08-23 NOTE — PLAN OF CARE
Problem: Falls - Risk of:  Goal: Will remain free from falls  Description  Will remain free from falls  Outcome: Ongoing   Pt able to bear weight. She was able to be transferred x2 with utilization of a jeremy stedy. Pt did experience a-fib with RVR during transfer and for approximately 15 after (HR 150s). Problem: COMMUNICATION IMPAIRMENT  Goal: Ability to express needs and understand communication  Outcome: Ongoing   Will continue to monitor. Pt continues to display gross global aphasia. Pt does show an increase in appropriate responses, yet overall still minimal. Will continue to monitor.

## 2019-08-23 NOTE — PROGRESS NOTES
Occupational Therapy   Occupational Therapy Initial Assessment  Date: 2019   Patient Name: Madeleine Strickland  MRN: [id number]     : 1921    Date of Service: 2019    Discharge Recommendations:Beverley Moody scored a  on the AM-PAC ADL Inpatient form. Current research shows that an AM-PAC score of 17 or less is typically not associated with a discharge to the patient's home setting. Based on the patients AM-PAC score and their current ADL deficits, it is recommended that the patient have 3-5 sessions per week of Occupational Therapy at d/c to increase the patients independence. OT Equipment Recommendations  Equipment Needed: No    Assessment   Performance deficits / Impairments: Decreased functional mobility ; Decreased ADL status; Decreased safe awareness;Decreased endurance;Decreased balance  Assessment: Patient presents with the above deficits impacting occupational performance. Patient has referral to hospice in chart. Trial therapy for CVA until plans made. Treatment Diagnosis: Decreased functional mobility, aDLs, safety, balance, endurance associated with L MCA stroke  Prognosis: Fair  Decision Making: Medium Complexity  OT Education: OT Role;Plan of Care;Transfer Training;Orientation  REQUIRES OT FOLLOW UP: Yes  Activity Tolerance  Activity Tolerance: Patient Tolerated treatment well;Treatment limited secondary to decreased cognition  Safety Devices  Safety Devices in place: Yes  Type of devices: All fall risk precautions in place;Call light within reach; Chair alarm in place; Left in chair;Patient at risk for falls;Gait belt;Nurse notified           Patient Diagnosis(es): The primary encounter diagnosis was Cerebrovascular accident (CVA) due to occlusion of left middle cerebral artery ([facility] [address] A diagnosis of Lung nodule was also pertinent to this visit.      has a past medical history of ASHD (arteriosclerotic heart disease), Atrial fibrillation ([address] Cancer ([address] Carotid stenosis, diverticulitis, DJD (degenerative joint disease), Hyperlipidemia, Hypertension, Macular degeneration, and Unspecified cerebral artery occlusion with cerebral infarction. has a past surgical history that includes Cholecystectomy (1982); fracture surgery (11/78); sinus surgery (1996); Skin cancer excision (8/23/12); Coronary angioplasty; joint replacement (10/17/2005); eye surgery; and skin biopsy. Treatment Diagnosis: Decreased functional mobility, aDLs, safety, balance, endurance associated with L MCA stroke      Restrictions  Restrictions/Precautions  Restrictions/Precautions: Fall Risk, Modified Diet(high fall risk; dysphagia diet with thins, pureed, no straw)  Required Braces or Orthoses?: No  Position Activity Restriction  Other position/activity restrictions: Shameka Gunn is a 80 y.o. female who presents to the emergency department today by EMS for possible stroke. The patient's son and daughter-in-law are in the room, and they provide the history. Son states that he took the patient to her primary care physician's office today, and he states that he dropped her off around 1 PM this afternoon. He states that she was acting at baseline at that time. The daughter-in-law states that she saw her on 80, and at the time she was not making sense with her words, and she states that she did not recognize who she was. tPA given 8/21/19    Subjective   General  Chart Reviewed: Yes  Additional Pertinent Hx: ASHD, A-fib, DJD, Macular degeneration, CVA  Diagnosis: MCA stroke with R weakness  Subjective  Subjective: Patient in bed. Makes eye contact. Spontaneous speech such as 'thank you' understandable.   Otherwise unable to voice intelligible words  General Comment  Comments: RN approval obtained  Patient Currently in Pain: No  Social/Functional History  Social/Functional History  Lives With: Son  Type of Home: (condo)  ADL Assistance: (family assists with ADLs and other needs)  Additional Comments: Patient unable to give history due to speech impairment. Lives with son, increasing weakness last few weeks. History of falls       Objective   Vision: Impaired(Macular degeneration at baseline. Unable to answer visual history)    Orientation  Overall Orientation Status: (unable to assess fully due to aphasia)  Observation/Palpation  Posture: Poor  Observation: severe kyphotic posture  Balance  Sitting Balance: Contact guard assistance  Standing Balance: Dependent/Total(Max and Min)  Functional Mobility  Functional - Mobility Device: No device  Assist Level: Dependent/Total(Max and Min)  Functional Mobility Comments: stand step transfers to chair  ADL  LE Dressing: Dependent/Total(socks)  Toileting: Dependent/Total  Tone RUE  RUE Tone: Normotonic  Tone LUE  LUE Tone: Normotonic  Coordination  Movements Are Fluid And Coordinated: No  Coordination and Movement description: Decreased speed;Decreased accuracy; Right UE;Left UE     Bed mobility  Supine to Sit: Dependent/Total(HOB elevated Max of 2)  Scooting: Moderate assistance(seated in chair)  Transfers  Stand Step Transfers: Dependent/Total  Sit to stand: Dependent/Total  Transfer Comments: Max of 1 and Mod 1  Vision - Basic Assessment  Prior Vision: (macular degeneration at baseline)  Visual History: Macular degeneration  Visual Field Cut: Not tested(difficult to assess due to cognition)  Oculo Motor Control: Impaired  Impairments: Tracking;Scanning  Cognition  Overall Cognitive Status: Exceptions  Arousal/Alertness: Delayed responses to stimuli  Following Commands: Inconsistently follows commands  Attention Span: Attends with cues to redirect; Difficulty attending to directions  Memory: (unable to fully assess)  Safety Judgement: Decreased awareness of need for safety  Problem Solving: Decreased awareness of errors  Insights: Decreased awareness of deficits  Initiation: Requires cues for all  Sequencing: Requires cues for all  Perception  Overall Perceptual Status: Impaired  Unilateral Attention: Cues to attend to right side of body     Sensation  Overall Sensation Status: (unable to assess fully )        LUE PROM (degrees)  LUE PROM: WFL  LUE AROM (degrees)  LUE General AROM: Moves distal extremity WFL  RUE PROM (degrees)  RUE PROM: WFL  RUE AROM (degrees)  RUE General AROM: No active movement on command. Moves UE spontaneously with bed mobililty  Right Hand PROM (degrees)  Right Hand PROM: WFL  LUE Strength  LUE Strength Comment: Unable to follow commands  RUE Strength  RUE Strength Comment: Unable to follow commands                   Plan   Plan  Times per week:  5-7  Times per day: Daily  Current Treatment Recommendations: Strengthening, Endurance Training, Safety Education & Training, Self-Care / ADL      AM-PAC Score        AM-[facility] Inpatient Daily Activity Raw Score: 6 (08/23/19 1502)  AM-PAC Inpatient ADL T-Scale Score : 17.07 (08/23/19 1502)  ADL Inpatient CMS 0-100% Score: 100 (08/23/19 1502)  ADL Inpatient CMS G-Code Modifier : CN (08/23/19 1502)    Goals  Short term goals  Time Frame for Short term goals: Discharge  Short term goal 1: Mod A with UB ADLS incorporating R UE  Short term goal 2: MOd A with bed mobility  Short term goal 3: Mod A with functional ADL transfers  Short term goal 4: Max A with LB ADLS  Long term goals  Time Frame for Long term goals : LTG=STG  Patient Goals   Patient goals : per  note possible hospice-patient unable to voice       Therapy Time   Individual Concurrent Group Co-treatment   Time In 1350         Time Out 1422         Minutes 32              Timed Code Treatment Minutes:  17 Minutes    Total Treatment Minutes:  [address], [address] [place] [address]  [address]

## 2019-08-23 NOTE — FLOWSHEET NOTE
CT results called from radiology stat to RN, reported to DR Cordova results and he reviewed results.

## 2019-08-23 NOTE — PROGRESS NOTES
[facility] PROGRESS NOTE    8/23/2019 12:32 PM        Name: Mak Guillory . Admitted: 8/21/2019  Primary Care Provider: Elder Jones MD (Tel: [phone])        Subjective: Patient is status post TPA  for acute CVA. Continues to have speech abnormality unable to communicate  as per  was able to communicate prior to the stroke however speech was impaired even before because of previous stroke. Response to verbal commands. Awaiting placement to SNF        Reviewed interval ancillary notes    Current Medications    0.9 % sodium chloride infusion Continuous   sodium chloride flush 0.9 % injection 10 mL 2 times per day   sodium chloride flush 0.9 % injection 10 mL PRN   magnesium hydroxide (MILK OF MAGNESIA) 400 MG/5ML suspension 30 mL Daily PRN   ondansetron (ZOFRAN) injection 4 mg Q6H PRN   enoxaparin (LOVENOX) injection 40 mg Daily   aspirin EC tablet 81 mg Daily   Or    aspirin suppository 300 mg Daily   atorvastatin (LIPITOR) tablet 80 mg Nightly   miconazole (MICOTIN) 2 % powder BID       Objective:  BP (!) 152/91   Pulse 103   Temp 97.2 °F (36.2 °C) (Temporal)   Resp 20   Ht 5' 2\" (1.575 m)   Wt 244 lb 4.3 oz (110.8 kg)   LMP 06/13/1975   SpO2 98%   BMI 44.68 kg/m²     Intake/Output Summary (Last 24 hours) at 8/23/2019 1232  Last data filed at 8/23/2019 1034  Gross per 24 hour   Intake 1736 ml   Output 1295 ml   Net 441 ml      Wt Readings from Last 3 Encounters:   08/23/19 244 lb 4.3 oz (110.8 kg)   08/21/19 105 lb (47.6 kg)   03/15/19 105 lb (47.6 kg)       General appearance:  Appears comfortable  Eyes: Sclera clear. Pupils equal.  ENT: Moist oral mucosa. Trachea midline, no adenopathy. Cardiovascular: Regular rhythm, normal S1, S2. No murmur. No edema in lower extremities  Respiratory: Not using accessory muscles. Good inspiratory effort.  Clear to auscultation bilaterally, no wheeze or crackles. GI: Abdomen soft, no tenderness, not distended, normal bowel sounds  Musculoskeletal: No cyanosis in digits, neck supple  Neurology: speech  impairment present   skin: Warm, dry, normal turgor  Extremity exam shows brisk capillary refill. Peripheral pulses are palpable in lower extremities     Labs and Tests:  CBC:   Recent Labs     08/21/19  1506 08/22/19  0430   WBC 9.6 9.5   HGB 15.3 15.5    174     BMP:    Recent Labs     08/21/19  1506 08/22/19  0430    138   K 4.6 4.6    105   CO2 24 23   BUN 20 15   CREATININE 1.1 0.9   GLUCOSE 89 103*     Hepatic:   Recent Labs     08/21/19  1506   AST 19   ALT 13   BILITOT 0.5   ALKPHOS 58     CT head without contrast   Final Result   Addendum 1 of 1   ADDENDUM:   Findings were discussed with the charge nurse, Paul Mcintosh, at 8:28 pm    on   8/22/2019. Final      XR CHEST PORTABLE   Final Result   No evidence of acute cardiopulmonary disease. CTA HEAD W CONTRAST   Final Result   Addendum 1 of 1   ADDENDUM:   Findings were discussed with Dr. Grace Pompa at 3:45 pm on 8/21/2019. Fleischner Society guidelines for follow-up and management of incidentally   detected pulmonary nodules:      Single Solid Nodule:      Nodule size less than 6 mm   In a low-risk patient, no routine follow-up. In a high-risk patient, optional CT at 12 months. Nodule size equals 6-8 mm   In a low-risk patient, CT at 6-12 months, then consider CT at 18-24    months. In a high-risk patient, CT at 6-12 months, then CT at 18-24 months. Nodule size greater than 8 mm         In a low-risk patient, consider CT at 3 months, PET/CT, or tissue    sampling. In a high-risk patient, consider CT at 3 months, PET/CT, or tissue    sampling.      - Low risk patients include individuals with minimal or absent history of   smoking and other known risk factors.       - High risk patients include individuals with a history or smoking or

## 2019-08-24 NOTE — PROGRESS NOTES
Worry: None     Inability: None    Transportation needs:     Medical: None     Non-medical: None   Tobacco Use    Smoking status: Former Smoker     Last attempt to quit: 1986     Years since quittin.1    Smokeless tobacco: Never Used   Substance and Sexual Activity    Alcohol use: No    Drug use: No    Sexual activity: None   Lifestyle    Physical activity:     Days per week: None     Minutes per session: None    Stress: None   Relationships    Social connections:     Talks on phone: None     Gets together: None     Attends L[employer] day service: None     Active member of club or organization: None     Attends meetings of clubs or organizations: None     Relationship status: None    Intimate partner violence:     Fear of current or ex partner: None     Emotionally abused: None     Physically abused: None     Forced sexual activity: None   Other Topics Concern    None   Social History Narrative    None        PHYSICAL EXAMINATION      BP (!) 165/86   Pulse 119   Temp 97.1 °F (36.2 °C) (Temporal)   Resp 18   Ht 5' 2\" (1.575 m)   Wt 241 lb 13.5 oz (109.7 kg)   LMP 1975   SpO2 97%   BMI 44.23 kg/m²   This is a thinly built patient in no acute distress  Patient is lethargic but arousable. She does not answer any questions for me. She has significant expressive aphasia. Pupils equal round reacting to light. Visual fields could not be tested. Extraocular movements are intact. Mild right facial droop. Tongue midline. Motor examination shows that strength is 2+ to 3/5 in the arms with some mild right-sided weakness compared to the left. Right leg strength is 1-2 over 5 in the left leg is 3+/5. Tone diminished on the right side. Could not cooperate for sensory or coordination testing. Plantar responses withdrawal bilaterally. Unable to ambulate.     DATA :  LABS:  General Labs:    CBC:   Lab Results   Component Value Date    WBC 9.5 2019    RBC 4.71 2019    HGB 15.5 08/22/2019    HCT 47.1 08/22/2019    .1 08/22/2019    MCH 33.0 08/22/2019    MCHC 33.0 08/22/2019    RDW 13.3 08/22/2019     08/22/2019    MPV 8.1 08/22/2019     BMP:    Lab Results   Component Value Date     08/22/2019    K 4.6 08/22/2019     08/22/2019    CO2 23 08/22/2019    BUN 15 08/22/2019    LABALBU 3.5 08/22/2019    CREATININE 0.9 08/22/2019    CALCIUM 9.0 08/22/2019    GFRAA >60 08/22/2019    GFRAA 60 01/31/2013    LABGLOM 58 08/22/2019    GLUCOSE 103 08/22/2019     RADIOLOGY REVIEW:  I have reviewed radiology image(s) and reports(s) of: CT scan of the head      IMPRESSION :  Acute nonhemorrhagic left frontotemporal infarction  Probably embolic due to atrial fibrillation  Subtherapeutic INR on admission  Other risk factors for cerebrovascular disease include age hypertension and hyperlipidemia  Patient Active Problem List   Diagnosis    Diverticulosis    DJD (degenerative joint disease)    ASHD (arteriosclerotic heart disease)    Macular degeneration    Hypercholesteremia    Hypertension    Carotid stenosis    CAD (coronary artery disease)    Dizziness    TIA (transient ischemic attack)    Acute CVA (cerebrovascular accident) ([address]    New onset atrial fibrillation ([address]    Cerebral infarction ([address]    Atrial fibrillation ([address]    Atrial fibrillation ([address]    Impacted cerumen of left ear    Atherosclerotic heart disease of native coronary artery with unstable angina pectoris ([address]     Bilateral impacted cerumen    Closed fracture of pelvis ([address]       RECOMMENDATIONS :  Discussed with patient's nurse  Plan is to get repeat CT head on Monday and then decide about anticoagulation          Please note a portion of this chart was generated using dragon dictation software. Although every effort was made to ensure the accuracy of this automated transcription, some errors in transcription may have occurred.          Ivone Manuel M.D.

## 2019-08-25 NOTE — PROGRESS NOTES
neck supple  Neurology: speech  impairment present   skin: Warm, dry, normal turgor  Extremity exam shows brisk capillary refill. Peripheral pulses are palpable in lower extremities     Labs and Tests:  CBC:   No results for input(s): WBC, HGB, PLT in the last 72 hours. BMP:    No results for input(s): NA, K, CL, CO2, BUN, CREATININE, GLUCOSE in the last 72 hours. Hepatic:   No results for input(s): AST, ALT, ALB, BILITOT, ALKPHOS in the last 72 hours. CT head without contrast   Final Result   Addendum 1 of 1   ADDENDUM:   Findings were discussed with the charge nurse, Agnes Pardo, at 8:28 pm    on   8/22/2019. Final      XR CHEST PORTABLE   Final Result   No evidence of acute cardiopulmonary disease. CTA HEAD W CONTRAST   Final Result   Addendum 1 of 1   ADDENDUM:   Findings were discussed with Dr. Zane Bland at 3:45 pm on 8/21/2019. Fleischner Society guidelines for follow-up and management of incidentally   detected pulmonary nodules:      Single Solid Nodule:      Nodule size less than 6 mm   In a low-risk patient, no routine follow-up. In a high-risk patient, optional CT at 12 months. Nodule size equals 6-8 mm   In a low-risk patient, CT at 6-12 months, then consider CT at 18-24    months. In a high-risk patient, CT at 6-12 months, then CT at 18-24 months. Nodule size greater than 8 mm         In a low-risk patient, consider CT at 3 months, PET/CT, or tissue    sampling. In a high-risk patient, consider CT at 3 months, PET/CT, or tissue    sampling.      - Low risk patients include individuals with minimal or absent history of   smoking and other known risk factors. - High risk patients include individuals with a history or smoking or    known   risk factors. Radiology 2017 http://pubs. rsna.org/doi/full/10.1148/radiol. 2303203748         Final      CTA NECK W CONTRAST   Final Result   Addendum 1 of 1   ADDENDUM:   Findings were discussed with Dr. Zane Bland at 3:45 pm on 8/21/2019. Fleischner Society guidelines for follow-up and management of incidentally   detected pulmonary nodules:      Single Solid Nodule:      Nodule size less than 6 mm   In a low-risk patient, no routine follow-up. In a high-risk patient, optional CT at 12 months. Nodule size equals 6-8 mm   In a low-risk patient, CT at 6-12 months, then consider CT at 18-24    months. In a high-risk patient, CT at 6-12 months, then CT at 18-24 months. Nodule size greater than 8 mm         In a low-risk patient, consider CT at 3 months, PET/CT, or tissue    sampling. In a high-risk patient, consider CT at 3 months, PET/CT, or tissue    sampling.      - Low risk patients include individuals with minimal or absent history of   smoking and other known risk factors. - High risk patients include individuals with a history or smoking or    known   risk factors. Radiology 2017 http://pubs. rsna.org/doi/full/10.1148/radiol. 7610748420         Final      CT HEAD WO CONTRAST   Final Result   1. No acute intracranial abnormality. Critical results were called by Dr. Barbara Lennon MD to Elizabeth Oviedo on 8/21/2019 at 15:28.   2. Unchanged chronic findings as above. CT HEAD WO CONTRAST    (Results Pending)         Problem List  Principal Problem:    Acute CVA (cerebrovascular accident) [facility])  Resolved Problems:    * No resolved hospital problems. *       Assessment & Plan:   1. Acute CVA status post TPA on 8/21/2019 at 1603 hrs. Refused recommendation for MRI-brain; very claustrophobic per children. CT of the head consistent with CVA  2. Expressive aphasia, right upper extremity weakness, bilateral lower extremity weakness, likely secondary to #1. Start aspirin and Lipitor   3. Acute encephalopathy likely secondary to underlying stroke. Therapy evaluate  History of atrial fibrillation (on chronic anticoagulation with Coumadin, INR subtherapeutic at 1.3 on presentation to the ER).

## 2019-08-25 NOTE — PROGRESS NOTES
insecurity:     Worry: None     Inability: None    Transportation needs:     Medical: None     Non-medical: None   Tobacco Use    Smoking status: Former Smoker     Last attempt to quit: 1986     Years since quittin.1    Smokeless tobacco: Never Used   Substance and Sexual Activity    Alcohol use: No    Drug use: No    Sexual activity: None   Lifestyle    Physical activity:     Days per week: None     Minutes per session: None    Stress: None   Relationships    Social connections:     Talks on phone: None     Gets together: None     Attends Worship service: None     Active member of club or organization: None     Attends meetings of clubs or organizations: None     Relationship status: None    Intimate partner violence:     Fear of current or ex partner: None     Emotionally abused: None     Physically abused: None     Forced sexual activity: None   Other Topics Concern    None   Social History Narrative    None        PHYSICAL EXAMINATION      /88   Pulse 91   Temp 97.2 °F (36.2 °C) (Axillary)   Resp 18   Ht 5' 2\" (1.575 m)   Wt 106 lb 11.2 oz (48.4 kg)   LMP 1975   SpO2 93%   BMI 19.52 kg/m²   This is a thinly built patient in no acute distress  Patient is lethargic but arousable. She does not answer any questions for me. She has significant expressive aphasia. Pupils equal round reacting to light. Visual fields could not be tested. Extraocular movements are intact. Mild right facial droop. Tongue midline. Motor examination shows that strength is 2+ to 3/5 in the arms with some mild right-sided weakness compared to the left. Right leg strength is 1-2 over 5 in the left leg is 3+/5. Tone diminished on the right side. Could not cooperate for sensory or coordination testing. Plantar responses withdrawal bilaterally. Unable to ambulate.     DATA :  LABS:  General Labs:    CBC:   Lab Results   Component Value Date    WBC 9.5 2019    RBC 4.71 2019    HGB M.D.

## 2019-08-25 NOTE — PROGRESS NOTES
Occupational Therapy      Attempted to see patient for OT treatment 3:58. Patient sound asleep in bed. Checked with RN Rosalind Quinonez who stated to let patient sleep. Left patient as she was in bed. Will plan to see patient per POC as patient available.        Allen Miner,  [facility]

## 2019-08-25 NOTE — PROGRESS NOTES
Ct scan called for this patient due to repeat CT of head ordered. This RN informed tech order was placed for Monday AM and not today. Patient to have repeat CT of head tomm am per neurology notes. Will continue to monitor.

## 2019-08-25 NOTE — PROGRESS NOTES
This RN attempted to feed patient dinner at this time. Patient only took 2 bites then stated \"that's awful\". At this time, family arrived to visit patient. Will attempt to feed again later. Will continue to monitor.

## 2019-08-25 NOTE — PLAN OF CARE
Problem: Falls - Risk of:  Goal: Will remain free from falls  Description  Will remain free from falls  Note:   Patient is a high fall risk. Patient free of falls this shift. Bed low, locked and alarmed at all times. Call light and bedside table is within reach. Orange blanket on bed, arm band on wrist and fall sign posted in the room. Notified patient to ask for assistance when needed. Patient verbalized understanding. Problem: Risk for Impaired Skin Integrity  Goal: Tissue integrity - skin and mucous membranes  Description  Structural intactness and normal physiological function of skin and  mucous membranes. Intervention: SKIN ASSESSMENT  Note:   No new skin issues noted upon assessment. Problem: HEMODYNAMIC STATUS  Goal: Patient has stable vital signs and fluid balance  Intervention: Blood pressure monitoring  Note:   VSS upon assessment w/ exception of elevated HR. Per chart pt appears to be in afib/afib RVR prior to discontinuation of tele monitor. If HR remains elevated will notify MD. Pt remains asymptomatic upon assessment. Problem: ACTIVITY INTOLERANCE/IMPAIRED MOBILITY  Goal: Mobility/activity is maintained at optimum level for patient  Note:   NIHSS 9 upon assessment. Unable to assess certain portions of neuro/NIHSS d/t pt unable to follow along or answer.

## 2019-08-26 PROBLEM — E44.0 MODERATE MALNUTRITION (HCC): Chronic | Status: ACTIVE | Noted: 2019-01-01

## 2019-08-26 NOTE — DISCHARGE INSTR - COC
Continuity of Care Form    Patient Name: Suyapa Rivero   :  1921  MRN:  [id number]    Admit date:  2019  Discharge date:  19    Code Status Order: DNR-CC   Advance Directives:     Admitting Physician:  Ritesh Colin DO  PCP: Sharon Small MD    Discharging Nurse: Pan Payne RN, BSN, PCCN.   [address] Unit/Room#: 6JN-9734/5425-30  Discharging Unit Phone Number: [phone]    Emergency Contact:   Extended Emergency Contact Information  Primary Emergency Contact: Mathew Chavez [address] Phone: [phone]  Relation: Child  Secondary Emergency Contact: Bernarda Alcaraz  Mobile Phone: [phone]  Relation: Child    Past Surgical History:  Past Surgical History:   Procedure Laterality Date    CHOLECYSTECTOMY      CORONARY ANGIOPLASTY      STENT    EYE SURGERY      BILATERAL CATARACTS    FRACTURE SURGERY      JOINT REPLACEMENT  10/17/2005    RTHR    SINUS SURGERY      SKIN BIOPSY      PRE CANCEROUS FACE    SKIN CANCER EXCISION  12    EXCSION BASAL CELL CA RIGHT TEMPLE, FROZEN SECTION REPAIR       Immunization History:   Immunization History   Administered Date(s) Administered    Influenza Virus Vaccine 10/18/2012, 10/15/2014    Influenza Whole 10/01/2011    Influenza, Marva Son, 3 Years and older, IM (Fluzone, Afluria) 2017    Pneumococcal Conjugate 13-valent (Estil Fore) 2016    Pneumococcal Polysaccharide ([id number]) 2012       Active Problems:  Patient Active Problem List   Diagnosis Code    Diverticulosis K57.90    DJD (degenerative joint disease) M19.90    ASHD (arteriosclerotic heart disease) I25.10    Macular degeneration     Hypercholesteremia E78.00    Hypertension I10    Carotid stenosis I65.29    CAD (coronary artery disease) I25.10    Dizziness R42    TIA (transient ischemic attack) G45.9    Acute CVA (cerebrovascular accident) ([facility] [address] I63.9    New onset atrial fibrillation

## 2019-08-26 NOTE — TELEPHONE ENCOUNTER
Son states patient will be discharged from [facility] to a SNF and will not be returning to the clinic. Asked him to call if there were any changes to patient's condition/POC.

## 2019-08-27 NOTE — ADT AUTH CERT
Utilization Reviews         Stroke: Ischemic - Care Day 6 (8/26/2019) by Santiago Ca RN         Review Status Review Entered   Completed 8/26/2019 15:25       Criteria Review      Care Day: 6 Care Date: 8/26/2019 Level of Care: ICU    Guideline Day 3    Clinical Status    (X) * Hemodynamic stability    (X) * Dangerous arrhythmia absent    (X) * Mental status at baseline or stable and appropriate for next level of care    (X) * Neurologic deficits absent or stable and appropriate for next level of care    (X) * Adequate dietary intake    (X) * Discharge plans and education understood    Activity    (X) * Up to chair    ( ) * Assisted ambulation as tolerated    8/26/2019 3:25 PM EDT by Nigel Haro      PT UNABLE TO WALK    Routes    ( ) * Oral hydration, medications, and diet    8/26/2019 3:25 PM EDT by Nigel Haro      dYSPHAGIA DIET RECOMMENDED    Interventions    (X) * Supplemental oxygen absent or at baseline requirement    Medications    (X) * Antithrombotic therapy appropriate for next level of care established [J]    8/26/2019 3:25 PM EDT by Nigel Haro    Subject: Additional Clinical Information       8/26/2019 CONCURRENT UPDATE                                                                               NEUROLOGY FOLLOWUP    /66    Pulse 111    Temp 98.4  °F (36.9  °C) (Axillary)    Resp 18    Ht 5' 2\" (1.575 m)    Wt 105 lb 11.2 oz (47.9 kg)    LMP 06/13/1975    SpO2 96%    BMI 19.33 kg/m ²    This is a thinly built patient in no acute distress    Patient is lethargic but arousable.   She does not answer any questions for me.   She has significant expressive aphasia.   Pupils equal round reacting to light.   Visual fields could not be tested.   Extraocular movements are intact.   Mild right facial droop.   Tongue midline.   Motor examination shows that strength is 2+ to 3/5 in the arms with some mild right-sided weakness compared to the left.   Right leg strength is 1-2 over 5 in the Straws, meds with puree    2) 1;1 assist with intake, feed slowly    3) If respiratory status declines or s/s of aspiration are consistently noted recommend downgrade to NPO pending re-assessment of swallow function            PER CM/DISCHARGE PLANNER : Mikel will accept patient floor to SNF when medically ready for discharge. Sivan submitted.     MD, Please complete & sign the e-RADHA under the discharge instructions, AVS/Prescriptions    Discharge patient (Order [id number]) Discharge                  Date:  8/26/2019Department:  [address] NursingReleased By/Authorizing: Indira Addison MD (auto-released)             * Milestone       Stroke: Ischemic - Care Day 3 (8/23/2019) by Tylor Alonzo RN         Review Status Review Entered   Completed 8/23/2019 16:10       Criteria Review      Care Day: 3 Care Date: 8/23/2019 Level of Care: ICU    Guideline Day 2    Level Of Care    (X) Stroke unit, ICU, telemetry, or floor    8/22/2019 3:44 PM EDT by Demar Perez      icu    Clinical Status    (X) * Hemodynamic stability    8/22/2019 3:44 PM EDT by Demar Perez      122/62-97.0-97-18    (X) * Mental status at baseline or stable    ( ) * Neurologic deficits absent or stable    8/23/2019 4:10 PM EDT by Olga Dumont      Expressive Aphasia, Acute Encephalopathy amaris secondary to stroke    8/22/2019 3:44 PM EDT by Sayda Scott still with dysarthria    ( ) * Unimpaired swallowing    8/23/2019 4:10 PM EDT by Olga Dumont      Dysphagia Pureed diet, No straw    8/22/2019 3:44 PM EDT by Demar Perez      swallow eval--Moderate Oropharyngeal Dysphagia    (X) * Tolerates sitting in chair    8/22/2019 3:44 PM EDT by Sayda Scott remains on bedrest post TPA    (X) * Feeding with or without assistance    8/22/2019 3:44 PM EDT by Demar Perez      dysphagia I diet    Activity    (X) * Up to chair    8/23/2019 4:10 PM EDT by Moraih Gardner Assist x 2 for transfer    8/22/2019 3:44 PM EDT by Demar Perez

## 2019-10-01 ENCOUNTER — CARE COORDINATION (OUTPATIENT)
Dept: CARE COORDINATION | Age: 84
End: 2019-10-01

## 2025-07-01 NOTE — PROGRESS NOTES
Patient called pre admit with questions regarding her medications prior to surgery. Reviewed medication instructions with patient from [facility] for preadmit appt on 06/16/25.Patient verbalized understanding of instructions and reports she will be here for her pre op testing appt this afternoon at 3:45 pm.    diverticulitis, DJD (degenerative joint disease), Hyperlipidemia, Hypertension, Macular degeneration, and Unspecified cerebral artery occlusion with cerebral infarction. has a past surgical history that includes Cholecystectomy (1982); fracture surgery (11/78); sinus surgery (1996); Skin cancer excision (8/23/12); Coronary angioplasty; joint replacement (10/17/2005); eye surgery; and skin biopsy. Restrictions  Restrictions/Precautions  Restrictions/Precautions: Fall Risk, Modified Diet(high fall risk; dysphagia diet with thins, pureed, no straw)  Required Braces or Orthoses?: No  Position Activity Restriction  Other position/activity restrictions: Claudia Del Cid is a 80 y.o. female who presents to the emergency department today by EMS for possible stroke. The patient's son and daughter-in-law are in the room, and they provide the history. Son states that he took the patient to her primary care physician's office today, and he states that he dropped her off around 1 PM this afternoon. He states that she was acting at baseline at that time. The daughter-in-law states that she saw her on 80, and at the time she was not making sense with her words, and she states that she did not recognize who she was. tPA given 8/21/19  Subjective   General  Chart Reviewed: Yes  Patient assessed for rehabilitation services?: Yes  Additional Pertinent Hx: ASHD, A-fib, DJD, Macular degeneration, CVA  Response to previous treatment: Patient with no complaints from previous session  Family / Caregiver Present: No  Diagnosis: MCA stroke with R weakness  Subjective  Subjective: Nursing approval prior to therapy session. Pt supine in bed with HOB raised, agreeable to therapy session. Pt throughout therapy session asking, \"Why? why? \" pt oriented x 4 and comforted. Other intelligible words spoken by pt, \"No,\" \"Bullshit\" \"Wow\"  General Comment  Comments: Pt supine to sit with HOB slightly raised, Max A.  Pt sat EOB Min A